# Patient Record
Sex: FEMALE | Race: WHITE | Employment: UNEMPLOYED | URBAN - METROPOLITAN AREA
[De-identification: names, ages, dates, MRNs, and addresses within clinical notes are randomized per-mention and may not be internally consistent; named-entity substitution may affect disease eponyms.]

---

## 2017-01-25 ENCOUNTER — HOSPITAL ENCOUNTER (EMERGENCY)
Age: 57
Discharge: HOME | End: 2017-01-25
Attending: EMERGENCY MEDICINE
Payer: MEDICARE

## 2017-01-25 VITALS
BODY MASS INDEX: 26.36 KG/M2 | RESPIRATION RATE: 18 BRPM | WEIGHT: 178 LBS | HEIGHT: 69 IN | OXYGEN SATURATION: 98 % | HEART RATE: 117 BPM | DIASTOLIC BLOOD PRESSURE: 98 MMHG | SYSTOLIC BLOOD PRESSURE: 155 MMHG | TEMPERATURE: 97.4 F

## 2017-01-25 DIAGNOSIS — M54.41 CHRONIC BILATERAL LOW BACK PAIN WITH BILATERAL SCIATICA: Primary | ICD-10-CM

## 2017-01-25 DIAGNOSIS — M54.42 CHRONIC BILATERAL LOW BACK PAIN WITH BILATERAL SCIATICA: Primary | ICD-10-CM

## 2017-01-25 DIAGNOSIS — G89.29 CHRONIC BILATERAL LOW BACK PAIN WITH BILATERAL SCIATICA: Primary | ICD-10-CM

## 2017-01-25 PROCEDURE — 99283 EMERGENCY DEPT VISIT LOW MDM: CPT | Performed by: NURSE PRACTITIONER

## 2017-01-25 PROCEDURE — 74011250637 HC RX REV CODE- 250/637: Performed by: NURSE PRACTITIONER

## 2017-01-25 RX ORDER — METHYLPREDNISOLONE 4 MG/1
TABLET ORAL
Qty: 1 DOSE PACK | Refills: 0 | Status: SHIPPED | OUTPATIENT
Start: 2017-01-25

## 2017-01-25 RX ORDER — DEXAMETHASONE SODIUM PHOSPHATE 100 MG/10ML
10 INJECTION INTRAMUSCULAR; INTRAVENOUS
Status: COMPLETED | OUTPATIENT
Start: 2017-01-25 | End: 2017-01-25

## 2017-01-25 RX ADMIN — DEXAMETHASONE SODIUM PHOSPHATE 10 MG: 10 INJECTION INTRAMUSCULAR; INTRAVENOUS at 15:32

## 2017-01-25 NOTE — ED TRIAGE NOTES
Reports has seen her MD and has ruptured discs and stenosis. States that she was told to come here for pain control.

## 2017-01-25 NOTE — Clinical Note
Take your medications that you have at home. Exercises given will help reduce pain. Start Medrol Dose pack in the morning. If you start having problems controlling your bowels and/or your bladder please return to the Emergency Department.

## 2024-01-16 ENCOUNTER — OFFICE VISIT (OUTPATIENT)
Dept: ORTHOPEDIC SURGERY | Age: 64
End: 2024-01-16

## 2024-01-16 DIAGNOSIS — M25.551 RIGHT HIP PAIN: Primary | ICD-10-CM

## 2024-01-16 DIAGNOSIS — M16.11 PRIMARY OSTEOARTHRITIS OF RIGHT HIP: ICD-10-CM

## 2024-01-16 NOTE — PROGRESS NOTES
Name: Olive Mireles  YOB: 1960  Gender: female  MRN: [id number]    CC:   Chief Complaint   Patient presents with    Hip Pain     Right hip          HPI: Olive Mireles is a 63 y.o. female with a PMHx of  depression and smoking  here for evaluation of right hip pain  There was not an acute injury  Regarding the hip pain, it has been present for 8 months and is becoming worse. The pain is primarily anterior into the groin  she describes the pain as aching, sharp, and stabbing  The pain is worse with going up and/or down stairs, rising after sitting, standing, and walking  The pain does radiate down the leg.  she reports numbness and tingling down the leg.   Treatment so far has been activity modification, meloxicam, and muscle relaxers with little relief.        Review of Systems  As per HPI.  Pertinent positives and negatives are addressed with the patient, particularly those related to musculoskeletal concerns.  Non-orthopaedic concerns were referred back to the primary care physician.      Not on File                 PHYSICAL EXAMINATION:   The patient is alert and oriented, in no distress.  There were no vitals filed for this visit.       The cervical, thoracic and lumbar spine are without compromising deformity. The upper extremities demonstrate reasonable tone, strength and function bilaterally.  The lower extremities are as described below.  Circulation is normal with palpable pedal pulses bilaterally and no edema.  Skin of the leg is normal with no chronic stasis disease bilaterally.  Sensation is intact to light touch bilaterally.  The gait is noted to be severe antalgic  There is no tenderness to palpation along the spinous processes and paraspinal musculature.   There moderate tenderness to palpation over the right greater trochanter  right hip range of motion is 0-90 degrees of flexion and 20 degrees on abduction and adduction.  There is 15 degrees internal rotation and 25 degrees of

## 2024-01-17 ENCOUNTER — TELEPHONE (OUTPATIENT)
Dept: ORTHOPEDIC SURGERY | Age: 64
End: 2024-01-17

## 2024-01-17 NOTE — TELEPHONE ENCOUNTER
Patient would like a return call to get the date of the surgery please, as she didn't write it down yesterday.

## 2024-01-22 ENCOUNTER — TELEPHONE (OUTPATIENT)
Dept: ORTHOPEDIC SURGERY | Age: 64
End: 2024-01-22

## 2024-01-23 ENCOUNTER — TELEPHONE (OUTPATIENT)
Dept: ORTHOPEDIC SURGERY | Age: 64
End: 2024-01-23

## 2024-01-23 NOTE — TELEPHONE ENCOUNTER
Pt is crying in pain from her Rt hip pain. She is requesting to be moved up from May for hip surgery. I recommended she go to the ER because of her acute pain and hopeless feelings of not being able to take the pain anymore. Pt says that she has gone to the ER multiple times and that they said they do not treat chronic pain. I told her I would contact Jayla and let her know about moving her up on the surgery schedule. Patient is with her sister because her nephew committed suicide at her home last week.

## 2024-02-01 ENCOUNTER — TELEPHONE (OUTPATIENT)
Dept: ORTHOPEDIC SURGERY | Age: 64
End: 2024-02-01

## 2024-02-02 NOTE — TELEPHONE ENCOUNTER
Spoke with patient and we will move her surgery up to 04/29/24 and keep her on the cancellaion list

## 2024-02-26 DIAGNOSIS — M16.11 PRIMARY OSTEOARTHRITIS OF RIGHT HIP: Primary | ICD-10-CM

## 2024-03-05 ENCOUNTER — TELEPHONE (OUTPATIENT)
Dept: ORTHOPEDIC SURGERY | Age: 64
End: 2024-03-05

## 2024-03-08 ENCOUNTER — TELEPHONE (OUTPATIENT)
Dept: ORTHOPEDIC SURGERY | Age: 64
End: 2024-03-08

## 2024-03-12 ENCOUNTER — TELEPHONE (OUTPATIENT)
Dept: ORTHOPEDIC SURGERY | Age: 64
End: 2024-03-12

## 2024-03-12 NOTE — TELEPHONE ENCOUNTER
Spoke with haleigh and let her know that we do have her on the cancellation list.will call her if we get one   - - -

## 2024-03-18 ENCOUNTER — TELEPHONE (OUTPATIENT)
Dept: ORTHOPEDIC SURGERY | Age: 64
End: 2024-03-18

## 2024-03-18 NOTE — TELEPHONE ENCOUNTER
Offered pt appointment to see Dr. Bhatti tomorrow and she declined, saying that she does not have a ride to our office. She is planning to go to [facility] and I advised her to bring any x-rays taken to our office if she needs to be seen here. Pt says she cannot raise her leg and can barely bear weight on her right side. Her hip to her ankle is hurting. This may be associated with her back. She is scheduled for rt valeria on 4/29/24 with Dr. Bhatti.

## 2024-03-18 NOTE — TELEPHONE ENCOUNTER
Pt called and said she took a back fall and her sx is supposed to be 04/29. She said her right hip to er knee is swollen and wants to speak to MA. Please call pt.

## 2024-03-18 NOTE — TELEPHONE ENCOUNTER
Viewed picture of right hip and bruising is over greater trochanter and femur. The bruising is yellowing and appears to be days old.

## 2024-03-27 ENCOUNTER — HOSPITAL ENCOUNTER (EMERGENCY)
Age: 64
Discharge: HOME | End: 2024-03-27
Payer: MEDICARE

## 2024-03-27 ENCOUNTER — TELEPHONE (OUTPATIENT)
Dept: ORTHOPEDIC SURGERY | Age: 64
End: 2024-03-27

## 2024-03-27 ENCOUNTER — APPOINTMENT (OUTPATIENT)
Dept: CT IMAGING | Age: 64
End: 2024-03-27
Payer: MEDICARE

## 2024-03-27 VITALS
DIASTOLIC BLOOD PRESSURE: 68 MMHG | TEMPERATURE: 97.9 F | SYSTOLIC BLOOD PRESSURE: 125 MMHG | HEIGHT: 67 IN | OXYGEN SATURATION: 99 % | RESPIRATION RATE: 18 BRPM | HEART RATE: 58 BPM | BODY MASS INDEX: 21.19 KG/M2 | WEIGHT: 135 LBS

## 2024-03-27 DIAGNOSIS — M16.11 ARTHRITIS OF RIGHT HIP: Primary | ICD-10-CM

## 2024-03-27 LAB
ANION GAP SERPL CALC-SCNC: 5 MMOL/L (ref 2–11)
BASOPHILS # BLD: 0.1 K/UL (ref 0–0.2)
BASOPHILS NFR BLD: 0 % (ref 0–2)
BUN SERPL-MCNC: 12 MG/DL (ref 8–23)
CALCIUM SERPL-MCNC: 9.8 MG/DL (ref 8.3–10.4)
CHLORIDE SERPL-SCNC: 101 MMOL/L (ref 103–113)
CO2 SERPL-SCNC: 27 MMOL/L (ref 21–32)
CREAT SERPL-MCNC: 0.7 MG/DL (ref 0.6–1)
DIFFERENTIAL METHOD BLD: ABNORMAL
EOSINOPHIL # BLD: 0.2 K/UL (ref 0–0.8)
EOSINOPHIL NFR BLD: 1 % (ref 0.5–7.8)
ERYTHROCYTE [DISTWIDTH] IN BLOOD BY AUTOMATED COUNT: 13.2 % (ref 11.9–14.6)
GLUCOSE SERPL-MCNC: 79 MG/DL (ref 65–100)
HCT VFR BLD AUTO: 43.1 % (ref 35.8–46.3)
HGB BLD-MCNC: 14.3 G/DL (ref 11.7–15.4)
IMM GRANULOCYTES # BLD AUTO: 0 K/UL (ref 0–0.5)
IMM GRANULOCYTES NFR BLD AUTO: 0 % (ref 0–5)
LYMPHOCYTES # BLD: 2.8 K/UL (ref 0.5–4.6)
LYMPHOCYTES NFR BLD: 21 % (ref 13–44)
MCH RBC QN AUTO: 29.4 PG (ref 26.1–32.9)
MCHC RBC AUTO-ENTMCNC: 33.2 G/DL (ref 31.4–35)
MCV RBC AUTO: 88.5 FL (ref 82–102)
MONOCYTES # BLD: 1 K/UL (ref 0.1–1.3)
MONOCYTES NFR BLD: 7 % (ref 4–12)
NEUTS SEG # BLD: 9.4 K/UL (ref 1.7–8.2)
NEUTS SEG NFR BLD: 71 % (ref 43–78)
NRBC # BLD: 0 K/UL (ref 0–0.2)
PLATELET # BLD AUTO: 281 K/UL (ref 150–450)
PMV BLD AUTO: 9.7 FL (ref 9.4–12.3)
POTASSIUM SERPL-SCNC: 3.7 MMOL/L (ref 3.5–5.1)
RBC # BLD AUTO: 4.87 M/UL (ref 4.05–5.2)
SODIUM SERPL-SCNC: 133 MMOL/L (ref 136–146)
WBC # BLD AUTO: 13.4 K/UL (ref 4.3–11.1)

## 2024-03-27 PROCEDURE — 6360000002 HC RX W HCPCS: Performed by: STUDENT IN AN ORGANIZED HEALTH CARE EDUCATION/TRAINING PROGRAM

## 2024-03-27 PROCEDURE — 85025 COMPLETE CBC W/AUTO DIFF WBC: CPT

## 2024-03-27 PROCEDURE — 96374 THER/PROPH/DIAG INJ IV PUSH: CPT

## 2024-03-27 PROCEDURE — 80048 BASIC METABOLIC PNL TOTAL CA: CPT

## 2024-03-27 PROCEDURE — 73700 CT LOWER EXTREMITY W/O DYE: CPT

## 2024-03-27 PROCEDURE — 99284 EMERGENCY DEPT VISIT MOD MDM: CPT

## 2024-03-27 PROCEDURE — 6370000000 HC RX 637 (ALT 250 FOR IP): Performed by: STUDENT IN AN ORGANIZED HEALTH CARE EDUCATION/TRAINING PROGRAM

## 2024-03-27 RX ORDER — MORPHINE SULFATE 4 MG/ML
4 INJECTION INTRAVENOUS ONCE
Status: DISCONTINUED | OUTPATIENT
Start: 2024-03-27 | End: 2024-03-27

## 2024-03-27 RX ORDER — HYDROCODONE BITARTRATE AND ACETAMINOPHEN 7.5; 325 MG/1; MG/1
1 TABLET ORAL EVERY 6 HOURS PRN
Qty: 12 TABLET | Refills: 0 | Status: SHIPPED | OUTPATIENT
Start: 2024-03-27 | End: 2024-03-30

## 2024-03-27 RX ORDER — ONDANSETRON 2 MG/ML
4 INJECTION INTRAMUSCULAR; INTRAVENOUS
Status: COMPLETED | OUTPATIENT
Start: 2024-03-27 | End: 2024-03-27

## 2024-03-27 RX ORDER — ONDANSETRON 4 MG/1
4 TABLET, FILM COATED ORAL 3 TIMES DAILY PRN
Qty: 15 TABLET | Refills: 0 | Status: SHIPPED | OUTPATIENT
Start: 2024-03-27

## 2024-03-27 RX ORDER — HYDROCODONE BITARTRATE AND ACETAMINOPHEN 7.5; 325 MG/1; MG/1
1 TABLET ORAL
Status: COMPLETED | OUTPATIENT
Start: 2024-03-27 | End: 2024-03-27

## 2024-03-27 RX ADMIN — ONDANSETRON 4 MG: 2 INJECTION INTRAMUSCULAR; INTRAVENOUS at 16:11

## 2024-03-27 RX ADMIN — HYDROCODONE BITARTRATE AND ACETAMINOPHEN 1 TABLET: 7.5; 325 TABLET ORAL at 16:10

## 2024-03-27 ASSESSMENT — ENCOUNTER SYMPTOMS
FACIAL SWELLING: 0
ABDOMINAL PAIN: 0
PHOTOPHOBIA: 0
VOMITING: 0
COUGH: 0
SHORTNESS OF BREATH: 0
TROUBLE SWALLOWING: 0

## 2024-03-27 ASSESSMENT — LIFESTYLE VARIABLES
HOW OFTEN DO YOU HAVE A DRINK CONTAINING ALCOHOL: NEVER
HOW MANY STANDARD DRINKS CONTAINING ALCOHOL DO YOU HAVE ON A TYPICAL DAY: PATIENT DOES NOT DRINK

## 2024-03-27 ASSESSMENT — PAIN SCALES - GENERAL: PAINLEVEL_OUTOF10: 8

## 2024-03-27 NOTE — ED PROVIDER NOTES
hip pain for two weeks, difficulty ambulating. eval  dislocation, fx    TECHNIQUE: Multiple-row detector helical CT examination of the right hip without  intravenous contrast.  Multiplanar reformatting was provided.    Radiation dose reduction techniques were used for this study. Our CT scanners  use one or all of the following: Automated exposure control, adjustment of the  mA and/or kV according to patient size, iterative reconstruction.     FINDINGS:  BONES: No acute fracture.     HIP JOINT: Sclerotic degenerative change and joint space narrowing right hip  with slight superior and lateral subluxation, small subarticular cysts, and  acetabular osteophytes. No fracture of pubic rami.    Left hip relatively preserved.    MUSCULATURE: No abnormal calcifications in muscles around the right hip.    SOFT TISSUES: Some subcutaneous edema noted adjacent to the right greater  trochanter coronal series 601 image 66        Impression    No acute fracture or dislocation right hip however there is relatively severe  degenerative change right hip with sclerotic obliteration of the joint space and  subarticular cysts.       BMP   Result Value Ref Range    Sodium 133 (L) 136 - 146 mmol/L    Potassium 3.7 3.5 - 5.1 mmol/L    Chloride 101 (L) 103 - 113 mmol/L    CO2 27 21 - 32 mmol/L    Anion Gap 5 2 - 11 mmol/L    Glucose 79 65 - 100 mg/dL    BUN 12 8 - 23 MG/DL    Creatinine 0.70 0.6 - 1.0 MG/DL    Est, Glom Filt Rate >90 >60 ml/min/1.73m2    Calcium 9.8 8.3 - 10.4 MG/DL   CBC with Auto Differential   Result Value Ref Range    WBC 13.4 (H) 4.3 - 11.1 K/uL    RBC 4.87 4.05 - 5.2 M/uL    Hemoglobin 14.3 11.7 - 15.4 g/dL    Hematocrit 43.1 35.8 - 46.3 %    MCV 88.5 82 - 102 FL    MCH 29.4 26.1 - 32.9 PG    MCHC 33.2 31.4 - 35.0 g/dL    RDW 13.2 11.9 - 14.6 %    Platelets 281 150 - 450 K/uL    MPV 9.7 9.4 - 12.3 FL    nRBC 0.00 0.0 - 0.2 K/uL    Differential Type AUTOMATED      Neutrophils % 71 43 - 78 %    Lymphocytes % 21 13 - 44

## 2024-03-27 NOTE — TELEPHONE ENCOUNTER
Please  call this  pt  asap      Her hip  is worse  something is  jutting  out  and  she  states  she is more  bed ridden.  Please  see previous messages

## 2024-03-27 NOTE — TELEPHONE ENCOUNTER
Spoke with patient and care giver . Patient is going to the er to eval the rt hip. Spoke with felipa wilson and let him know she was going so he could eval rt hip also will move surgery up to 04/09/24

## 2024-03-27 NOTE — DISCHARGE INSTRUCTIONS
Your CT did not show any broken bones or dislocations.  It did show significant arthritis.  I discussed this with your orthopedic doctor who wants you to follow-up at your scheduled surgery in April.  Please use prescribed pain medication to help control your pain.  Do not combine this with any other opioid pain medications.  Return here for new or worsening symptoms.    Risk of opioid analgesics include, but are not limited to: Overdosing can stop or slow your breathing and lead to death; fractures from falls; drowsiness leading to injury; tolerance, dependence and addiction. You should not operate any motorized vehicles or work from a height greater than ground level when taking opioid analgesics as they increase your fall risks. Do not combine these medications with any other opioid, to include street opioids such as heroin. Do not combine these medications with benzodiazepines like Xanax or alcohol as this may cause severe respiratory depression and death.    As we discussed, I did not find a life threatening cause of your symptoms today. However, THAT DOES NOT MEAN IT COULD NOT DEVELOP. If you develop ANY new or worsening symptoms, it is critical that you return for re-evaluation. This includes any symptoms that are concerning to you, especially symptoms such as fevers, true inability to bear weight, severe pain, redness, swelling.  If you do not return for re-evaluation, you risk serious complications, including death.

## 2024-04-01 ENCOUNTER — PREP FOR PROCEDURE (OUTPATIENT)
Dept: ORTHOPEDIC SURGERY | Age: 64
End: 2024-04-01

## 2024-04-01 DIAGNOSIS — M16.11 PRIMARY OSTEOARTHRITIS OF RIGHT HIP: Primary | ICD-10-CM

## 2024-04-01 RX ORDER — ACETAMINOPHEN 325 MG/1
1000 TABLET ORAL ONCE
Status: CANCELLED | OUTPATIENT
Start: 2024-04-01 | End: 2024-04-01

## 2024-04-01 RX ORDER — SODIUM CHLORIDE 0.9 % (FLUSH) 0.9 %
5-40 SYRINGE (ML) INJECTION EVERY 12 HOURS SCHEDULED
Status: CANCELLED | OUTPATIENT
Start: 2024-04-01

## 2024-04-01 RX ORDER — SODIUM CHLORIDE 0.9 % (FLUSH) 0.9 %
5-40 SYRINGE (ML) INJECTION PRN
Status: CANCELLED | OUTPATIENT
Start: 2024-04-01

## 2024-04-01 RX ORDER — SODIUM CHLORIDE 9 MG/ML
INJECTION, SOLUTION INTRAVENOUS PRN
Status: CANCELLED | OUTPATIENT
Start: 2024-04-01

## 2024-04-02 ENCOUNTER — HOSPITAL ENCOUNTER (OUTPATIENT)
Dept: SURGERY | Age: 64
Discharge: HOME | End: 2024-04-05
Payer: MEDICARE

## 2024-04-02 ENCOUNTER — HOSPITAL ENCOUNTER (OUTPATIENT)
Dept: REHABILITATION | Age: 64
Discharge: HOME | End: 2024-04-05
Payer: MEDICARE

## 2024-04-02 VITALS
RESPIRATION RATE: 16 BRPM | DIASTOLIC BLOOD PRESSURE: 61 MMHG | OXYGEN SATURATION: 95 % | HEART RATE: 62 BPM | WEIGHT: 136.69 LBS | SYSTOLIC BLOOD PRESSURE: 95 MMHG | BODY MASS INDEX: 21.45 KG/M2 | TEMPERATURE: 97.5 F | HEIGHT: 67 IN

## 2024-04-02 DIAGNOSIS — M16.11 PRIMARY OSTEOARTHRITIS OF RIGHT HIP: ICD-10-CM

## 2024-04-02 DIAGNOSIS — Z96.641 STATUS POST RIGHT HIP REPLACEMENT: ICD-10-CM

## 2024-04-02 DIAGNOSIS — M16.11 PRIMARY OSTEOARTHRITIS OF RIGHT HIP: Primary | ICD-10-CM

## 2024-04-02 DIAGNOSIS — R06.83 SNORING: Primary | ICD-10-CM

## 2024-04-02 DIAGNOSIS — G89.18 POSTOPERATIVE PAIN: ICD-10-CM

## 2024-04-02 LAB
ANION GAP SERPL CALC-SCNC: 7 MMOL/L (ref 2–11)
APTT PPP: 32.6 SEC (ref 23.3–37.4)
BASOPHILS # BLD: 0 K/UL (ref 0–0.2)
BASOPHILS NFR BLD: 1 % (ref 0–2)
BUN SERPL-MCNC: 10 MG/DL (ref 8–23)
CALCIUM SERPL-MCNC: 9.1 MG/DL (ref 8.3–10.4)
CHLORIDE SERPL-SCNC: 97 MMOL/L (ref 103–113)
CO2 SERPL-SCNC: 28 MMOL/L (ref 21–32)
CREAT SERPL-MCNC: 0.67 MG/DL (ref 0.6–1)
DIFFERENTIAL METHOD BLD: ABNORMAL
EKG ATRIAL RATE: 59 BPM
EKG DIAGNOSIS: NORMAL
EKG P AXIS: 27 DEGREES
EKG P-R INTERVAL: 152 MS
EKG Q-T INTERVAL: 402 MS
EKG QRS DURATION: 74 MS
EKG QTC CALCULATION (BAZETT): 397 MS
EKG R AXIS: 34 DEGREES
EKG T AXIS: 52 DEGREES
EKG VENTRICULAR RATE: 59 BPM
EOSINOPHIL # BLD: 0 K/UL (ref 0–0.8)
EOSINOPHIL NFR BLD: 0 % (ref 0.5–7.8)
ERYTHROCYTE [DISTWIDTH] IN BLOOD BY AUTOMATED COUNT: 13.3 % (ref 11.9–14.6)
EST. AVERAGE GLUCOSE BLD GHB EST-MCNC: 103 MG/DL
GLUCOSE SERPL-MCNC: 91 MG/DL (ref 65–100)
HBA1C MFR BLD: 5.2 % (ref 4.8–5.6)
HCT VFR BLD AUTO: 40.1 % (ref 35.8–46.3)
HGB BLD-MCNC: 13 G/DL (ref 11.7–15.4)
IMM GRANULOCYTES # BLD AUTO: 0 K/UL (ref 0–0.5)
IMM GRANULOCYTES NFR BLD AUTO: 0 % (ref 0–5)
INR PPP: 1
LYMPHOCYTES # BLD: 1 K/UL (ref 0.5–4.6)
LYMPHOCYTES NFR BLD: 26 % (ref 13–44)
MCH RBC QN AUTO: 28.8 PG (ref 26.1–32.9)
MCHC RBC AUTO-ENTMCNC: 32.4 G/DL (ref 31.4–35)
MCV RBC AUTO: 88.9 FL (ref 82–102)
MONOCYTES # BLD: 0.4 K/UL (ref 0.1–1.3)
MONOCYTES NFR BLD: 12 % (ref 4–12)
MRSA DNA SPEC QL NAA+PROBE: NOT DETECTED
NEUTS SEG # BLD: 2.3 K/UL (ref 1.7–8.2)
NEUTS SEG NFR BLD: 61 % (ref 43–78)
NRBC # BLD: 0 K/UL (ref 0–0.2)
PLATELET # BLD AUTO: 156 K/UL (ref 150–450)
PMV BLD AUTO: 11.2 FL (ref 9.4–12.3)
POTASSIUM SERPL-SCNC: 4.2 MMOL/L (ref 3.5–5.1)
PROTHROMBIN TIME: 13.6 SEC (ref 11.3–14.9)
RBC # BLD AUTO: 4.51 M/UL (ref 4.05–5.2)
S AUREUS CPE NOSE QL NAA+PROBE: NOT DETECTED
SODIUM SERPL-SCNC: 132 MMOL/L (ref 136–146)
WBC # BLD AUTO: 3.7 K/UL (ref 4.3–11.1)

## 2024-04-02 PROCEDURE — 85610 PROTHROMBIN TIME: CPT

## 2024-04-02 PROCEDURE — 85730 THROMBOPLASTIN TIME PARTIAL: CPT

## 2024-04-02 PROCEDURE — 80048 BASIC METABOLIC PNL TOTAL CA: CPT

## 2024-04-02 PROCEDURE — 87641 MR-STAPH DNA AMP PROBE: CPT

## 2024-04-02 PROCEDURE — 93005 ELECTROCARDIOGRAM TRACING: CPT | Performed by: ANESTHESIOLOGY

## 2024-04-02 PROCEDURE — 94664 DEMO&/EVAL PT USE INHALER: CPT

## 2024-04-02 PROCEDURE — 93010 ELECTROCARDIOGRAM REPORT: CPT | Performed by: INTERNAL MEDICINE

## 2024-04-02 PROCEDURE — 85025 COMPLETE CBC W/AUTO DIFF WBC: CPT

## 2024-04-02 PROCEDURE — 83036 HEMOGLOBIN GLYCOSYLATED A1C: CPT

## 2024-04-02 PROCEDURE — 97161 PT EVAL LOW COMPLEX 20 MIN: CPT

## 2024-04-02 RX ORDER — HYDROXYZINE HYDROCHLORIDE 25 MG/1
25 TABLET, FILM COATED ORAL EVERY 4 HOURS PRN
COMMUNITY
Start: 2022-04-07

## 2024-04-02 RX ORDER — DESVENLAFAXINE 100 MG/1
100 TABLET, EXTENDED RELEASE ORAL DAILY
COMMUNITY

## 2024-04-02 RX ORDER — METHOCARBAMOL 750 MG/1
750 TABLET, FILM COATED ORAL 3 TIMES DAILY PRN
Qty: 30 TABLET | Refills: 0 | Status: SHIPPED | OUTPATIENT
Start: 2024-04-08 | End: 2024-04-18

## 2024-04-02 RX ORDER — DULOXETIN HYDROCHLORIDE 30 MG/1
30 CAPSULE, DELAYED RELEASE ORAL DAILY
COMMUNITY

## 2024-04-02 RX ORDER — CELECOXIB 200 MG/1
200 CAPSULE ORAL 2 TIMES DAILY
Qty: 60 CAPSULE | Refills: 0 | Status: SHIPPED | OUTPATIENT
Start: 2024-04-08 | End: 2024-05-08

## 2024-04-02 RX ORDER — OXYCODONE HYDROCHLORIDE 5 MG/1
5-10 TABLET ORAL
Qty: 60 TABLET | Refills: 0 | Status: SHIPPED | OUTPATIENT
Start: 2024-04-08 | End: 2024-04-13

## 2024-04-02 RX ORDER — MELOXICAM 15 MG/1
1 TABLET ORAL AS NEEDED
COMMUNITY
Start: 2018-10-08

## 2024-04-02 RX ORDER — NALOXONE HYDROCHLORIDE 4 MG/.1ML
1 SPRAY NASAL PRN
COMMUNITY
Start: 2022-07-26

## 2024-04-02 RX ORDER — PREGABALIN 200 MG/1
200 CAPSULE ORAL 2 TIMES DAILY
COMMUNITY
Start: 2024-03-26 | End: 2024-06-24

## 2024-04-02 RX ORDER — PROMETHAZINE HYDROCHLORIDE 12.5 MG/1
12.5 TABLET ORAL EVERY 8 HOURS PRN
COMMUNITY
End: 2024-04-02

## 2024-04-02 RX ORDER — ASPIRIN 81 MG/1
81 TABLET ORAL 2 TIMES DAILY
Qty: 70 TABLET | Refills: 0 | Status: SHIPPED | OUTPATIENT
Start: 2024-04-08 | End: 2024-05-13

## 2024-04-02 RX ORDER — NICOTINE 21 MG/24HR
1 PATCH, TRANSDERMAL 24 HOURS TRANSDERMAL EVERY 24 HOURS
COMMUNITY
Start: 2024-03-12 | End: 2025-03-12

## 2024-04-02 RX ORDER — TIZANIDINE 4 MG/1
4 TABLET ORAL EVERY 8 HOURS PRN
COMMUNITY

## 2024-04-02 RX ORDER — PROMETHAZINE HYDROCHLORIDE 12.5 MG/1
12.5 TABLET ORAL 4 TIMES DAILY PRN
Qty: 20 TABLET | Refills: 2 | Status: SHIPPED | OUTPATIENT
Start: 2024-04-08

## 2024-04-02 RX ORDER — ALBUTEROL SULFATE 90 UG/1
1-2 AEROSOL, METERED RESPIRATORY (INHALATION) EVERY 4 HOURS PRN
COMMUNITY
Start: 2023-01-31

## 2024-04-02 RX ORDER — CLONAZEPAM 1 MG/1
1 TABLET, ORALLY DISINTEGRATING ORAL 2 TIMES DAILY PRN
COMMUNITY

## 2024-04-02 RX ORDER — LEVOTHYROXINE SODIUM 0.12 MG/1
125 TABLET ORAL DAILY
COMMUNITY

## 2024-04-02 ASSESSMENT — PROMIS GLOBAL HEALTH SCALE
IN GENERAL, HOW WOULD YOU RATE YOUR SATISFACTION WITH YOUR SOCIAL ACTIVITIES AND RELATIONSHIPS [ON A SCALE OF 1 (POOR) TO 5 (EXCELLENT)]?: FAIR
IN GENERAL, WOULD YOU SAY YOUR QUALITY OF LIFE IS...[ON A SCALE OF 1 (POOR) TO 5 (EXCELLENT)]: POOR
WHO IS THE PERSON COMPLETING THE PROMIS V1.1 SURVEY?: SELF
SUM OF RESPONSES TO QUESTIONS 3, 6, 7, & 8: 14
IN THE PAST 7 DAYS, HOW OFTEN HAVE YOU BEEN BOTHERED BY EMOTIONAL PROBLEMS, SUCH AS FEELING ANXIOUS, DEPRESSED, OR IRRITABLE [ON A SCALE FROM 1 (NEVER) TO 5 (ALWAYS)]?: ALWAYS
IN GENERAL, HOW WOULD YOU RATE YOUR MENTAL HEALTH, INCLUDING YOUR MOOD AND YOUR ABILITY TO THINK [ON A SCALE OF 1 (POOR) TO 5 (EXCELLENT)]?: FAIR
IN THE PAST 7 DAYS, HOW WOULD YOU RATE YOUR PAIN ON AVERAGE [ON A SCALE FROM 0 (NO PAIN) TO 10 (WORST IMAGINABLE PAIN)]?: 8
IN GENERAL, HOW WOULD YOU RATE YOUR PHYSICAL HEALTH [ON A SCALE OF 1 (POOR) TO 5 (EXCELLENT)]?: POOR
SUM OF RESPONSES TO QUESTIONS 2, 4, 5, & 10: 6
HOW IS THE PROMIS V1.1 BEING ADMINISTERED?: PAPER
IN GENERAL, PLEASE RATE HOW WELL YOU CARRY OUT YOUR USUAL SOCIAL ACTIVITIES (INCLUDES ACTIVITIES AT HOME, AT WORK, AND IN YOUR COMMUNITY, AND RESPONSIBILITIES AS A PARENT, CHILD, SPOUSE, EMPLOYEE, FRIEND, ETC) [ON A SCALE OF 1 (POOR) TO 5 (EXCELLENT)]?: FAIR
IN THE PAST 7 DAYS, HOW WOULD YOU RATE YOUR FATIGUE ON AVERAGE [ON A SCALE FROM 1 (NONE) TO 5 (VERY SEVERE)]?: MODERATE
TO WHAT EXTENT ARE YOU ABLE TO CARRY OUT YOUR EVERYDAY PHYSICAL ACTIVITIES SUCH AS WALKING, CLIMBING STAIRS, CARRYING GROCERIES, OR MOVING A CHAIR [ON A SCALE OF 1 (NOT AT ALL) TO 5 (COMPLETELY)]?: A LITTLE
IN GENERAL, WOULD YOU SAY YOUR HEALTH IS...[ON A SCALE OF 1 (POOR) TO 5 (EXCELLENT)]: POOR

## 2024-04-02 ASSESSMENT — HOOS JR
SITTING: EXTREME
GOING UP OR DOWN STAIRS: EXTREME
WALKING ON UNEVEN SURFACE: EXTREME
LYING IN BED (TURNING OVER, MAINTAINING HIP POSITION): EXTREME
HOOS JR RAW SCORE: 22
RISING FROM SITTING: SEVERE
HOOS JR TOTAL INTERVAL SCORE: 15.633
BENDING TO THE FLOOR TO PICK UP OBJECT: SEVERE
HOOS JR RAW SCORE: 22

## 2024-04-02 ASSESSMENT — PULMONARY FUNCTION TESTS
FEV1 (%PREDICTED): 79
FEV1 (LITERS): 2.03

## 2024-04-02 ASSESSMENT — PAIN DESCRIPTION - LOCATION
LOCATION: HIP
LOCATION: HIP

## 2024-04-02 ASSESSMENT — PAIN SCALES - GENERAL
PAINLEVEL_OUTOF10: 10
PAINLEVEL_OUTOF10: 9

## 2024-04-02 ASSESSMENT — PAIN DESCRIPTION - ORIENTATION
ORIENTATION: RIGHT;ANTERIOR;INNER;OUTER;POSTERIOR
ORIENTATION: RIGHT

## 2024-04-02 NOTE — PROGRESS NOTES
Patient verified name and .    Order for consent not found in EHR and unable to match consent with case posting; patient verified.     Type 3 surgery, joint camp assessment complete.    Labs per surgeon: CBC,BMP, PT/INR, Hgb A1c ; results within anesthesia guidelines  Labs per anesthesia protocol: no additional  EKG:completed today per protocol and within anesthesia guidelines    MRSA/MSSA swab collected; pharmacy to review and dose antibiotic as appropriate.     Hospital approved surgical skin cleanser and instructions to return bottle on DOS given per hospital policy.    Patient provided with handouts including Guide to Surgery, Pain Management, Hand Hygiene, Blood Transfusion Education, and [facility] Brochure.    Patient answered medical/surgical history questions at their best of ability. All prior to admission medications documented in [facility]. Original medication prescription bottle not visualized during patient appointment.     Patient instructed to hold all vitamins 3 weeks prior to surgery and NSAIDS 5 days prior to surgery.     Patient teach back successful and patient demonstrates knowledge of instruction.

## 2024-04-02 NOTE — PROGRESS NOTES
PLEASE CONTINUE TAKING ALL PRESCRIPTION MEDICATIONS UP TO THE DAY OF SURGERY UNLESS OTHERWISE DIRECTED BELOW.    DISCONTINUE all vitamins, herbals and supplements 3 weeks prior to surgery. DISCONTINUE Non-Steroidal Anti-Inflammatory (NSAIDS) such as Advil, Ibuprofen, Motrin, Naproxen and Aleve 5 days prior to surgery.       Home Medications to take  the day of surgery    USE/BRING inhalers   Clonazepam, if needed   Lyrica, Pristiq, Cymbalta, Levothyroxine     Home Medications to Hold- please continue all other medications except these.    HOLD Meloxicam for 5 days prior to surgery        Comments   On the day before surgery please take Acetaminophen 1000mg in the morning and then again before bed. You may substitute for Tylenol 650 mg.      Bring Dynahex wash and Incentive Spirometer with you to hospital on the day of surgery.            Please do not bring home medications with you on the day of surgery unless otherwise directed by your nurse.  If you are instructed to bring home medications, please give them to your nurse as they will be administered by the nursing staff.    If you have any questions, please call [facility] [phone].    A copy of this note was provided to the patient for reference.

## 2024-04-02 NOTE — H&P
[facility]  Pre Operative History and Physical Exam    Patient ID:  Olive Mireles  [id number]  63 y.o.  1960    Today: April 2, 2024           CC:  Right hip pain    HPI:   The patient has end stage arthritis of the right hip. The patient was evaluated and examined during a consultation prior to this office visit.  There have been no changes to the patient's orthopedic condition since the initial consultation. The patient has failed previous conservative treatment for this condition including antiinflammatories , and lifestyle modifications. The necessity for joint replacement is present. The patient will be admitted the day of surgery for right hip replacement    Past Medical/Surgical History:  Past Medical History:   Diagnosis Date    Anxiety     Arthritis     Asthma     Chronic bilateral low back pain with bilateral sciatica     Chronic dental pain     Complicated grief     DDD (degenerative disc disease), cervical     History of blood transfusion 1990    Hypotension     Hypothyroidism     ILD (interstitial lung disease) ([facility])     followed by pulmonary specialist    [facility] historian     Tobacco abuse disorder     1 ppd smoker 11 years; down to 5 cigarettes now, trying to quit     Past Surgical History:   Procedure Laterality Date    APPENDECTOMY      CERVICAL DISCECTOMY  09/20/2019    CERVICAL DISCECTOMY  2017    CERVICAL LAMINECTOMY      with decompression C2-3 fusion and fixation    COLONOSCOPY      FOOT SURGERY Left 2001    HYSTERECTOMY (CERVIX STATUS UNKNOWN)  2001    POSTERIOR CERVICAL LAMINECTOMY  05/25/2022    cervical three-seven posterior fusion and fixation with four-five laminectomy    SPINAL FUSION  10/19/2022    cervical        Allergies:   Allergies   Allergen Reactions    Acetaminophen Swelling    Penicillins Anaphylaxis, Hives, Rash and Swelling    Tapentadol Anaphylaxis and Other (See Comments)     Other Reaction(s): Other- (not listed) - Allergy      Heart palpitation

## 2024-04-02 NOTE — PROGRESS NOTES
04/02/24 1130   Treatment   Treatment Type Bedside spirometry   Breath Sounds   Breath Sounds Bilateral Diminished   Oxygen Therapy/Pulse Ox   O2 Therapy Room air   Pulse 62   SpO2 95 %   Bedside Spirometry   FEV-1/Actual (Liters) 2.03 Liters   FEV-1/Predicted (Liters) 79 Liters   RT Misc Charges   $RT Education $HHN/MDI/IPPB Demo or Eval  (SPACER)     Pt's symptoms include:    Snoring  Tiredness- excessive daytime sleepiness  Observed apnea  HX OF HTN  GERD  Neck size        36      cm  Modified Soares stage 4  SACS Score 11    Height    5  '   7 \"   Weight   136  lbs  BMI 21.41      Refer patient for sleep study based on above assessment.  HST  Phone number:      Initial respiratory Assessment completed with pt. Pt was interviewed and evaluated in Joint camp prior to surgery.  Patient ID:  Olive Mireles  [id number]  63 y.o.  1960  Surgeon: Dr. Bhatti  Date of Surgery: [unfilled]4/9/2024  Procedure: Total Right Hip Arthroplasty  Primary Care Physician: Joselyn Burns, APRN - [phone]  Specialists:BRITTNEY WHITE- PULMONOLOGY AT [facility]    Pt taught proper COUGH technique  IS REVIEWED WITH PT AS WELL AS BENEFITS OF USING IS IN SEDENTARY PTS.  DIAPHRAGMATIC BREATHING EXERCISE INSTRUCTIONS GIVEN    History of smoking:   CURRENT SMOKER-      1    PPD for      30      YEARS- DOWN TO 5 CIGARETTES/DAY- TRYING TO QUIT  Smoking Cessation  \"SMOKING: YOUR PLAN TO QUIT\" handout given to pt.    Pt taught to identify when anxiety or stress  is a trigger .  Relaxation breathing technique taught to pt.                 Quit date:         Secondhand smoke:DENIES    Past procedures with Oxygen desaturation or delayed awakening:DENIES     Respiratory history:COPD/ASTHMA  ILD  PNA 4 TIMES- HOSPITALIZED TWICE                                 SOB  ON EXERTION                                    Respiratory meds: COMBIVENT RESPIMAT BID  PT uses MDI PRN with PROAIR.   MDI instructions given verbally & written along

## 2024-04-02 NOTE — PROGRESS NOTES
Olive Mireles  : 1960  Primary: [employer]  Secondary: [place]ICA[place] [facility] [place] [address]  [phone]      Physical Therapy Prehab Evaluation Summary:2024   Time In/Out   PT Charge Capture  Episode     MEDICAL/REFERRING DIAGNOSIS: Unilateral primary osteoarthritis, right hip [M16.11]  REFERRING PHYSICIAN: Sean Bhatti Jr., *    Treatment Diagnosis:   Pain in Right Hip (M25.551)  Stiffness of Right Hip, Not elsewhere classified (M25.651)    DATE OF SURGERY: 24  Assessment:   COMMENTS:  Ms. Mireles is present for a Prehab Physical Therapy Assessment for their upcoming right MICHAEL . They are here with her sister . After discussing the surgical admission options and discharge plans, they are planning on discharging on day of surgery.    She is in a lot of pain and is very limited in her ambulation distance due to this. Uses a RW at home and cane outside just for ease of transport. She wants to go home on the day of surgery. I am a little nervous about this with her extreme limitations currently. We will see how she does. She lives alone but says that her  sister and a home health aide will help her.    PROBLEM LIST:   (Impacting functional limitations):  Ms. Mireles presents with the following lower extremity(s) problems:  Strength  Range of Motion  Home Exercise Program  Pain INTERVENTIONS PLANNED:   (Benefits and precautions of physical therapy have been discussed with the patient.)  Home Exercise Program  Educational Discussion       GOALS: (Goals have been discussed and agreed upon with patient.)  Discharge Goals: Time Frame: 1 Day  Patient will demonstrate independence with a home exercise program designed to increase strength, range of motion, and pain control to minimize functional deficits and optimize patient for total joint replacement.    Subjective:   Past Medical History/Comorbidities:   Ms. Mireles  has a past

## 2024-04-02 NOTE — TELEPHONE ENCOUNTER
Called pt and let her know that we do not need to see her for a preop since she has already seen Dr. Bhatti recently. Pt plans to be a SDD

## 2024-04-02 NOTE — TELEPHONE ENCOUNTER
Patient would like to reschedule her Pre Op appt for this afternoon. Patient says she can barley stand because she has been on her feet today for about 3 hours at Joint camp. Please call

## 2024-04-02 NOTE — PROGRESS NOTES
Latest Reference Range & Units 04/02/24 10:44   Sodium 136 - 146 mmol/L 132 (L)   Potassium 3.5 - 5.1 mmol/L 4.2   Chloride 103 - 113 mmol/L 97 (L)   CO2 21 - 32 mmol/L 28   BUN,BUNPL 8 - 23 MG/DL 10   Creatinine 0.6 - 1.0 MG/DL 0.67   Anion Gap 2 - 11 mmol/L 7   Est, Glom Filt Rate >60 ml/min/1.73m2 >90   Glucose, Random 65 - 100 mg/dL 91   CALCIUM, SERUM, 520190 8.3 - 10.4 MG/DL 9.1   Hemoglobin A1C 4.8 - 5.6 % 5.2   eAG (mg/dL) mg/dL 103   WBC 4.3 - 11.1 K/uL 3.7 (L)   RBC 4.05 - 5.2 M/uL 4.51   Hemoglobin Quant 11.7 - 15.4 g/dL 13.0   Hematocrit 35.8 - 46.3 % 40.1   MCV 82.0 - 102.0 FL 88.9   MCH 26.1 - 32.9 PG 28.8   MCHC 31.4 - 35.0 g/dL 32.4   MPV 9.4 - 12.3 FL 11.2   RDW 11.9 - 14.6 % 13.3   Platelet Count 150 - 450 K/uL 156   Neutrophils, Automated 43 - 78 % 61   Lymphocyte % 13 - 44 % 26   Monocytes % 4.0 - 12.0 % 12   Eosinophils % 0.5 - 7.8 % 0 (L)   Basophils % 0.0 - 2.0 % 1   Neutrophils Absolute 1.7 - 8.2 K/UL 2.3   Lymphocytes Absolute 0.5 - 4.6 K/UL 1.0   Monocytes Absolute 0.1 - 1.3 K/UL 0.4   Eosinophils Absolute 0.0 - 0.8 K/UL 0.0   Basophils Absolute 0.0 - 0.2 K/UL 0.0   Differential Type -   AUTOMATED   Immature Granulocytes 0.0 - 5.0 % 0   Nucleated Red Blood Cells 0.0 - 0.2 K/uL 0.00   Absolute Immature Granulocyte 0.0 - 0.5 K/UL 0.0   Prothrombin Time 11.3 - 14.9 sec 13.6   INR -   1.0   APTT 23.3 - 37.4 SEC 32.6   (L): Data is abnormally low

## 2024-04-08 ENCOUNTER — TELEPHONE (OUTPATIENT)
Dept: ORTHOPEDIC SURGERY | Age: 64
End: 2024-04-08

## 2024-04-08 NOTE — TELEPHONE ENCOUNTER
Spoke with patient and she has a cough . She had spoke to someone at the hospital and they explained that she needs to show up for her evaluation

## 2024-04-08 NOTE — PERIOP NOTE
Pt states she has been having a persistent, productive cough with clear sputum, and SOB, cough is worse when laying down. Per patient, no fever or GI symptoms.  notified and patient instructed to take some allergy medicine and we will reevaluate upon arrival tomorrow, its possible case will be postponed and patient was made aware.

## 2024-04-09 ENCOUNTER — ANESTHESIA (OUTPATIENT)
Dept: SURGERY | Age: 64
End: 2024-04-09
Payer: MEDICARE

## 2024-04-09 ENCOUNTER — HOME HEALTH ADMISSION (OUTPATIENT)
Dept: HOME HEALTH SERVICES | Facility: HOME HEALTH | Age: 64
End: 2024-04-09
Payer: MEDICARE

## 2024-04-09 ENCOUNTER — APPOINTMENT (OUTPATIENT)
Dept: GENERAL RADIOLOGY | Age: 64
End: 2024-04-09
Attending: ORTHOPAEDIC SURGERY
Payer: MEDICARE

## 2024-04-09 ENCOUNTER — ANESTHESIA EVENT (OUTPATIENT)
Dept: SURGERY | Age: 64
End: 2024-04-09
Payer: MEDICARE

## 2024-04-09 ENCOUNTER — HOSPITAL ENCOUNTER (OUTPATIENT)
Age: 64
Discharge: HOME HEALTH | End: 2024-04-11
Attending: ORTHOPAEDIC SURGERY | Admitting: ORTHOPAEDIC SURGERY
Payer: MEDICARE

## 2024-04-09 DIAGNOSIS — Z96.641 STATUS POST RIGHT HIP REPLACEMENT: Primary | ICD-10-CM

## 2024-04-09 LAB
HCT VFR BLD AUTO: 37.4 % (ref 35.8–46.3)
HGB BLD-MCNC: 11.9 G/DL (ref 11.7–15.4)

## 2024-04-09 PROCEDURE — 85018 HEMOGLOBIN: CPT

## 2024-04-09 PROCEDURE — 6360000002 HC RX W HCPCS: Performed by: ANESTHESIOLOGY

## 2024-04-09 PROCEDURE — 94761 N-INVAS EAR/PLS OXIMETRY MLT: CPT

## 2024-04-09 PROCEDURE — C1713 ANCHOR/SCREW BN/BN,TIS/BN: HCPCS | Performed by: ORTHOPAEDIC SURGERY

## 2024-04-09 PROCEDURE — 3700000001 HC ADD 15 MINUTES (ANESTHESIA): Performed by: ORTHOPAEDIC SURGERY

## 2024-04-09 PROCEDURE — 6370000000 HC RX 637 (ALT 250 FOR IP): Performed by: ANESTHESIOLOGY

## 2024-04-09 PROCEDURE — 2580000003 HC RX 258: Performed by: ANESTHESIOLOGY

## 2024-04-09 PROCEDURE — 6360000002 HC RX W HCPCS: Performed by: REGISTERED NURSE

## 2024-04-09 PROCEDURE — 3600000005 HC SURGERY LEVEL 5 BASE: Performed by: ORTHOPAEDIC SURGERY

## 2024-04-09 PROCEDURE — 2500000003 HC RX 250 WO HCPCS: Performed by: ANESTHESIOLOGY

## 2024-04-09 PROCEDURE — 2580000003 HC RX 258: Performed by: PHYSICIAN ASSISTANT

## 2024-04-09 PROCEDURE — 6370000000 HC RX 637 (ALT 250 FOR IP): Performed by: PHYSICIAN ASSISTANT

## 2024-04-09 PROCEDURE — 87205 SMEAR GRAM STAIN: CPT

## 2024-04-09 PROCEDURE — 72170 X-RAY EXAM OF PELVIS: CPT

## 2024-04-09 PROCEDURE — 97161 PT EVAL LOW COMPLEX 20 MIN: CPT

## 2024-04-09 PROCEDURE — 3700000000 HC ANESTHESIA ATTENDED CARE: Performed by: ORTHOPAEDIC SURGERY

## 2024-04-09 PROCEDURE — 97530 THERAPEUTIC ACTIVITIES: CPT

## 2024-04-09 PROCEDURE — 94760 N-INVAS EAR/PLS OXIMETRY 1: CPT

## 2024-04-09 PROCEDURE — 97535 SELF CARE MNGMENT TRAINING: CPT

## 2024-04-09 PROCEDURE — 87075 CULTR BACTERIA EXCEPT BLOOD: CPT

## 2024-04-09 PROCEDURE — 7100000001 HC PACU RECOVERY - ADDTL 15 MIN: Performed by: ORTHOPAEDIC SURGERY

## 2024-04-09 PROCEDURE — 27130 TOTAL HIP ARTHROPLASTY: CPT | Performed by: ORTHOPAEDIC SURGERY

## 2024-04-09 PROCEDURE — 6360000002 HC RX W HCPCS: Performed by: ORTHOPAEDIC SURGERY

## 2024-04-09 PROCEDURE — 6370000000 HC RX 637 (ALT 250 FOR IP): Performed by: ORTHOPAEDIC SURGERY

## 2024-04-09 PROCEDURE — 3600000015 HC SURGERY LEVEL 5 ADDTL 15MIN: Performed by: ORTHOPAEDIC SURGERY

## 2024-04-09 PROCEDURE — 2500000003 HC RX 250 WO HCPCS: Performed by: REGISTERED NURSE

## 2024-04-09 PROCEDURE — 6360000002 HC RX W HCPCS: Performed by: PHYSICIAN ASSISTANT

## 2024-04-09 PROCEDURE — 85014 HEMATOCRIT: CPT

## 2024-04-09 PROCEDURE — 94640 AIRWAY INHALATION TREATMENT: CPT

## 2024-04-09 PROCEDURE — 7100000000 HC PACU RECOVERY - FIRST 15 MIN: Performed by: ORTHOPAEDIC SURGERY

## 2024-04-09 PROCEDURE — 2500000003 HC RX 250 WO HCPCS: Performed by: PHYSICIAN ASSISTANT

## 2024-04-09 PROCEDURE — 2580000003 HC RX 258: Performed by: REGISTERED NURSE

## 2024-04-09 PROCEDURE — 87070 CULTURE OTHR SPECIMN AEROBIC: CPT

## 2024-04-09 PROCEDURE — C1776 JOINT DEVICE (IMPLANTABLE): HCPCS | Performed by: ORTHOPAEDIC SURGERY

## 2024-04-09 PROCEDURE — 97165 OT EVAL LOW COMPLEX 30 MIN: CPT

## 2024-04-09 PROCEDURE — 36415 COLL VENOUS BLD VENIPUNCTURE: CPT

## 2024-04-09 PROCEDURE — 88304 TISSUE EXAM BY PATHOLOGIST: CPT

## 2024-04-09 PROCEDURE — 2709999900 HC NON-CHARGEABLE SUPPLY: Performed by: ORTHOPAEDIC SURGERY

## 2024-04-09 PROCEDURE — 2720000010 HC SURG SUPPLY STERILE: Performed by: ORTHOPAEDIC SURGERY

## 2024-04-09 DEVICE — CERAMIC V40 FEMORAL HEAD
Type: IMPLANTABLE DEVICE | Site: HIP | Status: FUNCTIONAL
Brand: BIOLOX

## 2024-04-09 DEVICE — TRIDENT X3 10 DEGREE POLYETHYLENE INSERT
Type: IMPLANTABLE DEVICE | Site: HIP | Status: FUNCTIONAL
Brand: TRIDENT X3 INSERT

## 2024-04-09 DEVICE — COMPONENT TOT HIP CAPPED LNR POLYETH H2STRYKER] STRYKER CORP]: Type: IMPLANTABLE DEVICE | Site: HIP | Status: FUNCTIONAL

## 2024-04-09 DEVICE — TRIDENT II TRITANIUM CLUSTER 54E
Type: IMPLANTABLE DEVICE | Site: HIP | Status: FUNCTIONAL
Brand: TRIDENT II

## 2024-04-09 DEVICE — HIP STEM - STANDARD OFFSET
Type: IMPLANTABLE DEVICE | Site: HIP | Status: FUNCTIONAL
Brand: INSIGNIA

## 2024-04-09 DEVICE — 6.5MM LOW PROFILE HEX SCREW 25MM
Type: IMPLANTABLE DEVICE | Site: HIP | Status: FUNCTIONAL
Brand: TRIDENT II

## 2024-04-09 RX ORDER — ASPIRIN 81 MG/1
81 TABLET ORAL 2 TIMES DAILY
Status: DISCONTINUED | OUTPATIENT
Start: 2024-04-09 | End: 2024-04-11 | Stop reason: HOSPADM

## 2024-04-09 RX ORDER — KETOROLAC TROMETHAMINE 30 MG/ML
INJECTION, SOLUTION INTRAMUSCULAR; INTRAVENOUS PRN
Status: DISCONTINUED | OUTPATIENT
Start: 2024-04-09 | End: 2024-04-09 | Stop reason: ALTCHOICE

## 2024-04-09 RX ORDER — SODIUM CHLORIDE 0.9 % (FLUSH) 0.9 %
5-40 SYRINGE (ML) INJECTION EVERY 12 HOURS SCHEDULED
Status: DISCONTINUED | OUTPATIENT
Start: 2024-04-09 | End: 2024-04-11 | Stop reason: HOSPADM

## 2024-04-09 RX ORDER — FENTANYL CITRATE 50 UG/ML
INJECTION, SOLUTION INTRAMUSCULAR; INTRAVENOUS PRN
Status: DISCONTINUED | OUTPATIENT
Start: 2024-04-09 | End: 2024-04-09 | Stop reason: SDUPTHER

## 2024-04-09 RX ORDER — TRANEXAMIC ACID 100 MG/ML
INJECTION, SOLUTION INTRAVENOUS PRN
Status: DISCONTINUED | OUTPATIENT
Start: 2024-04-09 | End: 2024-04-09 | Stop reason: SDUPTHER

## 2024-04-09 RX ORDER — CLONAZEPAM 1 MG/1
1 TABLET ORAL 2 TIMES DAILY PRN
Status: DISCONTINUED | OUTPATIENT
Start: 2024-04-09 | End: 2024-04-11 | Stop reason: HOSPADM

## 2024-04-09 RX ORDER — SENNA AND DOCUSATE SODIUM 50; 8.6 MG/1; MG/1
1 TABLET, FILM COATED ORAL 2 TIMES DAILY
Status: DISCONTINUED | OUTPATIENT
Start: 2024-04-09 | End: 2024-04-11 | Stop reason: HOSPADM

## 2024-04-09 RX ORDER — DIPHENHYDRAMINE HCL 25 MG
25 CAPSULE ORAL EVERY 6 HOURS PRN
Status: DISCONTINUED | OUTPATIENT
Start: 2024-04-09 | End: 2024-04-11 | Stop reason: HOSPADM

## 2024-04-09 RX ORDER — PROCHLORPERAZINE EDISYLATE 5 MG/ML
10 INJECTION INTRAMUSCULAR; INTRAVENOUS EVERY 6 HOURS PRN
Status: DISCONTINUED | OUTPATIENT
Start: 2024-04-09 | End: 2024-04-11 | Stop reason: HOSPADM

## 2024-04-09 RX ORDER — LIDOCAINE HYDROCHLORIDE 10 MG/ML
1 INJECTION, SOLUTION INFILTRATION; PERINEURAL
Status: COMPLETED | OUTPATIENT
Start: 2024-04-09 | End: 2024-04-09

## 2024-04-09 RX ORDER — HYDROMORPHONE HYDROCHLORIDE 2 MG/1
2 TABLET ORAL
Status: DISCONTINUED | OUTPATIENT
Start: 2024-04-09 | End: 2024-04-11 | Stop reason: HOSPADM

## 2024-04-09 RX ORDER — ALBUTEROL SULFATE 2.5 MG/3ML
2.5 SOLUTION RESPIRATORY (INHALATION)
Status: COMPLETED | OUTPATIENT
Start: 2024-04-09 | End: 2024-04-09

## 2024-04-09 RX ORDER — ACETAMINOPHEN 500 MG
2 TABLET ORAL EVERY 6 HOURS PRN
COMMUNITY

## 2024-04-09 RX ORDER — SODIUM CHLORIDE 0.9 % (FLUSH) 0.9 %
5-40 SYRINGE (ML) INJECTION PRN
Status: DISCONTINUED | OUTPATIENT
Start: 2024-04-09 | End: 2024-04-11 | Stop reason: HOSPADM

## 2024-04-09 RX ORDER — OXYCODONE HYDROCHLORIDE 5 MG/1
5 TABLET ORAL
Status: COMPLETED | OUTPATIENT
Start: 2024-04-09 | End: 2024-04-09

## 2024-04-09 RX ORDER — SODIUM CHLORIDE 9 MG/ML
INJECTION, SOLUTION INTRAVENOUS PRN
Status: DISCONTINUED | OUTPATIENT
Start: 2024-04-09 | End: 2024-04-11 | Stop reason: HOSPADM

## 2024-04-09 RX ORDER — TRAMADOL HYDROCHLORIDE 50 MG/1
100 TABLET ORAL EVERY 6 HOURS PRN
Status: DISCONTINUED | OUTPATIENT
Start: 2024-04-09 | End: 2024-04-11 | Stop reason: HOSPADM

## 2024-04-09 RX ORDER — PROPOFOL 10 MG/ML
INJECTION, EMULSION INTRAVENOUS PRN
Status: DISCONTINUED | OUTPATIENT
Start: 2024-04-09 | End: 2024-04-09 | Stop reason: SDUPTHER

## 2024-04-09 RX ORDER — HYDROMORPHONE HYDROCHLORIDE 1 MG/ML
0.5 INJECTION, SOLUTION INTRAMUSCULAR; INTRAVENOUS; SUBCUTANEOUS EVERY 5 MIN PRN
Status: COMPLETED | OUTPATIENT
Start: 2024-04-09 | End: 2024-04-09

## 2024-04-09 RX ORDER — ACETAMINOPHEN 325 MG/1
650 TABLET ORAL EVERY 6 HOURS
Status: DISCONTINUED | OUTPATIENT
Start: 2024-04-09 | End: 2024-04-09

## 2024-04-09 RX ORDER — SODIUM CHLORIDE, SODIUM LACTATE, POTASSIUM CHLORIDE, CALCIUM CHLORIDE 600; 310; 30; 20 MG/100ML; MG/100ML; MG/100ML; MG/100ML
INJECTION, SOLUTION INTRAVENOUS CONTINUOUS
Status: DISCONTINUED | OUTPATIENT
Start: 2024-04-09 | End: 2024-04-09 | Stop reason: HOSPADM

## 2024-04-09 RX ORDER — HYDROMORPHONE HYDROCHLORIDE 1 MG/ML
1 INJECTION, SOLUTION INTRAMUSCULAR; INTRAVENOUS; SUBCUTANEOUS
Status: DISCONTINUED | OUTPATIENT
Start: 2024-04-09 | End: 2024-04-09

## 2024-04-09 RX ORDER — ROPIVACAINE HYDROCHLORIDE 2 MG/ML
INJECTION, SOLUTION EPIDURAL; INFILTRATION; PERINEURAL PRN
Status: DISCONTINUED | OUTPATIENT
Start: 2024-04-09 | End: 2024-04-09 | Stop reason: ALTCHOICE

## 2024-04-09 RX ORDER — MORPHINE SULFATE 2 MG/ML
2 INJECTION, SOLUTION INTRAMUSCULAR; INTRAVENOUS
Status: DISCONTINUED | OUTPATIENT
Start: 2024-04-09 | End: 2024-04-11 | Stop reason: HOSPADM

## 2024-04-09 RX ORDER — ALBUTEROL SULFATE 2.5 MG/3ML
2.5 SOLUTION RESPIRATORY (INHALATION) EVERY 6 HOURS PRN
Status: DISCONTINUED | OUTPATIENT
Start: 2024-04-09 | End: 2024-04-11 | Stop reason: HOSPADM

## 2024-04-09 RX ORDER — VANCOMYCIN HYDROCHLORIDE 1 G/20ML
INJECTION, POWDER, LYOPHILIZED, FOR SOLUTION INTRAVENOUS PRN
Status: DISCONTINUED | OUTPATIENT
Start: 2024-04-09 | End: 2024-04-09 | Stop reason: ALTCHOICE

## 2024-04-09 RX ORDER — ONDANSETRON 2 MG/ML
4 INJECTION INTRAMUSCULAR; INTRAVENOUS
Status: DISCONTINUED | OUTPATIENT
Start: 2024-04-09 | End: 2024-04-09 | Stop reason: HOSPADM

## 2024-04-09 RX ORDER — DIPHENHYDRAMINE HYDROCHLORIDE 50 MG/ML
25 INJECTION INTRAMUSCULAR; INTRAVENOUS EVERY 6 HOURS PRN
Status: DISCONTINUED | OUTPATIENT
Start: 2024-04-09 | End: 2024-04-11 | Stop reason: HOSPADM

## 2024-04-09 RX ORDER — ONDANSETRON 4 MG/1
4 TABLET, ORALLY DISINTEGRATING ORAL EVERY 8 HOURS PRN
Status: DISCONTINUED | OUTPATIENT
Start: 2024-04-09 | End: 2024-04-11 | Stop reason: HOSPADM

## 2024-04-09 RX ORDER — LIDOCAINE HYDROCHLORIDE 20 MG/ML
INJECTION, SOLUTION EPIDURAL; INFILTRATION; INTRACAUDAL; PERINEURAL PRN
Status: DISCONTINUED | OUTPATIENT
Start: 2024-04-09 | End: 2024-04-09 | Stop reason: SDUPTHER

## 2024-04-09 RX ORDER — NALOXONE HYDROCHLORIDE 0.4 MG/ML
INJECTION, SOLUTION INTRAMUSCULAR; INTRAVENOUS; SUBCUTANEOUS PRN
Status: DISCONTINUED | OUTPATIENT
Start: 2024-04-09 | End: 2024-04-09 | Stop reason: HOSPADM

## 2024-04-09 RX ORDER — ONDANSETRON 2 MG/ML
4 INJECTION INTRAMUSCULAR; INTRAVENOUS EVERY 6 HOURS PRN
Status: DISCONTINUED | OUTPATIENT
Start: 2024-04-09 | End: 2024-04-11 | Stop reason: HOSPADM

## 2024-04-09 RX ORDER — METHOCARBAMOL 750 MG/1
750 TABLET, FILM COATED ORAL 3 TIMES DAILY PRN
Status: DISCONTINUED | OUTPATIENT
Start: 2024-04-09 | End: 2024-04-11 | Stop reason: HOSPADM

## 2024-04-09 RX ORDER — OXYCODONE HYDROCHLORIDE 5 MG/1
10 TABLET ORAL EVERY 4 HOURS PRN
Status: DISCONTINUED | OUTPATIENT
Start: 2024-04-09 | End: 2024-04-09

## 2024-04-09 RX ADMIN — HYDROMORPHONE HYDROCHLORIDE 0.5 MG: 1 INJECTION, SOLUTION INTRAMUSCULAR; INTRAVENOUS; SUBCUTANEOUS at 10:33

## 2024-04-09 RX ADMIN — MEPIVACAINE HYDROCHLORIDE 60 MG: 20 INJECTION, SOLUTION EPIDURAL; INFILTRATION at 08:16

## 2024-04-09 RX ADMIN — HYDROMORPHONE HYDROCHLORIDE 2 MG: 2 TABLET ORAL at 22:58

## 2024-04-09 RX ADMIN — TRANEXAMIC ACID 1000 MG: 100 INJECTION, SOLUTION INTRAVENOUS at 08:30

## 2024-04-09 RX ADMIN — SODIUM CHLORIDE, PRESERVATIVE FREE 10 ML: 5 INJECTION INTRAVENOUS at 22:58

## 2024-04-09 RX ADMIN — FENTANYL CITRATE 25 MCG: 50 INJECTION, SOLUTION INTRAMUSCULAR; INTRAVENOUS at 08:18

## 2024-04-09 RX ADMIN — CEFAZOLIN 2000 MG: 10 INJECTION, POWDER, FOR SOLUTION INTRAVENOUS at 16:04

## 2024-04-09 RX ADMIN — PREGABALIN 200 MG: 150 CAPSULE ORAL at 19:43

## 2024-04-09 RX ADMIN — HYDROMORPHONE HYDROCHLORIDE 1 MG: 1 INJECTION, SOLUTION INTRAMUSCULAR; INTRAVENOUS; SUBCUTANEOUS at 14:12

## 2024-04-09 RX ADMIN — HYDROMORPHONE HYDROCHLORIDE 1 MG: 1 INJECTION, SOLUTION INTRAMUSCULAR; INTRAVENOUS; SUBCUTANEOUS at 18:03

## 2024-04-09 RX ADMIN — OXYCODONE 10 MG: 5 TABLET ORAL at 16:04

## 2024-04-09 RX ADMIN — DOCUSATE SODIUM 50MG AND SENNOSIDES 8.6MG 1 TABLET: 8.6; 5 TABLET, FILM COATED ORAL at 19:43

## 2024-04-09 RX ADMIN — HYDROMORPHONE HYDROCHLORIDE 0.5 MG: 1 INJECTION, SOLUTION INTRAMUSCULAR; INTRAVENOUS; SUBCUTANEOUS at 11:12

## 2024-04-09 RX ADMIN — OXYCODONE 5 MG: 5 TABLET ORAL at 11:47

## 2024-04-09 RX ADMIN — PHENYLEPHRINE HYDROCHLORIDE 150 MCG: 0.1 INJECTION, SOLUTION INTRAVENOUS at 10:13

## 2024-04-09 RX ADMIN — ALBUTEROL SULFATE 2.5 MG: 2.5 SOLUTION RESPIRATORY (INHALATION) at 07:20

## 2024-04-09 RX ADMIN — HYDROMORPHONE HYDROCHLORIDE 0.5 MG: 1 INJECTION, SOLUTION INTRAMUSCULAR; INTRAVENOUS; SUBCUTANEOUS at 10:38

## 2024-04-09 RX ADMIN — PROPOFOL 50 MG: 10 INJECTION, EMULSION INTRAVENOUS at 08:23

## 2024-04-09 RX ADMIN — CLONAZEPAM 1 MG: 1 TABLET ORAL at 20:11

## 2024-04-09 RX ADMIN — METHOCARBAMOL TABLETS 750 MG: 750 TABLET, COATED ORAL at 13:37

## 2024-04-09 RX ADMIN — ASPIRIN 81 MG: 81 TABLET, COATED ORAL at 19:43

## 2024-04-09 RX ADMIN — FENTANYL CITRATE 25 MCG: 50 INJECTION, SOLUTION INTRAMUSCULAR; INTRAVENOUS at 08:16

## 2024-04-09 RX ADMIN — LIDOCAINE HYDROCHLORIDE 1 ML: 10 INJECTION, SOLUTION INFILTRATION; PERINEURAL at 07:13

## 2024-04-09 RX ADMIN — Medication 2000 MG: at 08:25

## 2024-04-09 RX ADMIN — MORPHINE SULFATE 2 MG: 2 INJECTION, SOLUTION INTRAMUSCULAR; INTRAVENOUS at 21:09

## 2024-04-09 RX ADMIN — PROPOFOL 100 MCG/KG/MIN: 10 INJECTION, EMULSION INTRAVENOUS at 08:24

## 2024-04-09 RX ADMIN — MORPHINE SULFATE 2 MG: 2 INJECTION, SOLUTION INTRAMUSCULAR; INTRAVENOUS at 22:57

## 2024-04-09 RX ADMIN — CLONAZEPAM 1 MG: 1 TABLET ORAL at 16:04

## 2024-04-09 RX ADMIN — PHENYLEPHRINE HYDROCHLORIDE 100 MCG: 0.1 INJECTION, SOLUTION INTRAVENOUS at 08:25

## 2024-04-09 RX ADMIN — LIDOCAINE HYDROCHLORIDE 100 MG: 20 INJECTION, SOLUTION EPIDURAL; INFILTRATION; INTRACAUDAL; PERINEURAL at 08:23

## 2024-04-09 RX ADMIN — OXYCODONE 10 MG: 5 TABLET ORAL at 19:29

## 2024-04-09 RX ADMIN — HYDROMORPHONE HYDROCHLORIDE 0.5 MG: 1 INJECTION, SOLUTION INTRAMUSCULAR; INTRAVENOUS; SUBCUTANEOUS at 11:17

## 2024-04-09 RX ADMIN — PHENYLEPHRINE HYDROCHLORIDE 50 MCG/MIN: 10 INJECTION INTRAVENOUS at 08:25

## 2024-04-09 RX ADMIN — TRANEXAMIC ACID 1000 MG: 100 INJECTION, SOLUTION INTRAVENOUS at 09:38

## 2024-04-09 RX ADMIN — SODIUM CHLORIDE, POTASSIUM CHLORIDE, SODIUM LACTATE AND CALCIUM CHLORIDE: 600; 310; 30; 20 INJECTION, SOLUTION INTRAVENOUS at 07:13

## 2024-04-09 RX ADMIN — PROCHLORPERAZINE EDISYLATE 10 MG: 5 INJECTION INTRAMUSCULAR; INTRAVENOUS at 22:57

## 2024-04-09 ASSESSMENT — PAIN DESCRIPTION - PAIN TYPE
TYPE: SURGICAL PAIN

## 2024-04-09 ASSESSMENT — PAIN SCALES - GENERAL
PAINLEVEL_OUTOF10: 9
PAINLEVEL_OUTOF10: 10
PAINLEVEL_OUTOF10: 7
PAINLEVEL_OUTOF10: 8
PAINLEVEL_OUTOF10: 7
PAINLEVEL_OUTOF10: 3
PAINLEVEL_OUTOF10: 10
PAINLEVEL_OUTOF10: 10
PAINLEVEL_OUTOF10: 8
PAINLEVEL_OUTOF10: 9
PAINLEVEL_OUTOF10: 10
PAINLEVEL_OUTOF10: 4
PAINLEVEL_OUTOF10: 9
PAINLEVEL_OUTOF10: 7

## 2024-04-09 ASSESSMENT — PAIN DESCRIPTION - DESCRIPTORS
DESCRIPTORS: ACHING;HEAVINESS;BURNING
DESCRIPTORS: ACHING;BURNING
DESCRIPTORS: ACHING
DESCRIPTORS: ACHING;BURNING;HEAVINESS
DESCRIPTORS: ACHING
DESCRIPTORS: ACHING
DESCRIPTORS: ACHING;THROBBING

## 2024-04-09 ASSESSMENT — PAIN DESCRIPTION - ORIENTATION
ORIENTATION: RIGHT

## 2024-04-09 ASSESSMENT — PAIN DESCRIPTION - LOCATION
LOCATION: HIP

## 2024-04-09 ASSESSMENT — COPD QUESTIONNAIRES: CAT_SEVERITY: MODERATE

## 2024-04-09 ASSESSMENT — PAIN - FUNCTIONAL ASSESSMENT: PAIN_FUNCTIONAL_ASSESSMENT: 0-10

## 2024-04-09 ASSESSMENT — LIFESTYLE VARIABLES: SMOKING_STATUS: 1

## 2024-04-09 NOTE — CARE COORDINATION
Patient is a 63 y.o. year old female admitted for Right MICHAEL . Patient plans to return home on discharge. Order received to arrange home health. Patient without preference towards agency. Referral sent to [facility]. Patient has a walker and her sister purchased a 3-in-1  bedside commode. Pt without preference towards provider. Referral sent to [employer] [facility]. Sister made payment arrangements directly  with [employer].  Equipment delivered to the hospital room prior to discharge. Will follow until discharge.      04/09/24 7606   Service Assessment   Patient Orientation Alert and Oriented   Cognition Alert   History Provided By Patient   Primary Caregiver Self   Services At/After Discharge   Transition of Care Consult (CM Consult) Home Health   Internal Home Health Yes   Services At/After Discharge Home Health;PT   Mode of Transport at Discharge Self   Confirm Follow Up Transport Self   Condition of Participation: Discharge Planning   The Plan for Transition of Care is related to the following treatment goals: improve mobility   The Patient and/or Patient Representative was provided with a Choice of Provider? Patient   The Patient and/Or Patient Representative agree with the Discharge Plan? Yes   Freedom of Choice list was provided with basic dialogue that supports the patient's individualized plan of care/goals, treatment preferences, and shares the quality data associated with the providers?  Yes

## 2024-04-09 NOTE — ANESTHESIA POSTPROCEDURE EVALUATION
Department of Anesthesiology  Postprocedure Note    Patient: Olive Mireles  MRN: [id number]  YOB: 1960  Date of evaluation: 4/9/2024    Procedure Summary       Date: 04/09/24 Room / Location: [facility] MAIN OR 01 / [facility] MAIN OR    Anesthesia Start: 0804 Anesthesia Stop: 1023    Procedure: rt HIP TOTAL ARTHROPLASTY/ SDD (Right: Hip) Diagnosis:       Primary osteoarthritis of right hip      (Primary osteoarthritis of right hip [M16.11])    Surgeons: Sean Bhatti Jr., MD Responsible Provider: Omari Lopez Jr., MD    Anesthesia Type: spinal ASA Status: 3            Anesthesia Type: No value filed.    Saul Phase I: Saul Score: 7    Saul Phase II:      Anesthesia Post Evaluation    Patient location during evaluation: PACU  Patient participation: complete - patient participated  Level of consciousness: awake and alert  Pain score: 2  Airway patency: patent  Nausea & Vomiting: no nausea and no vomiting  Cardiovascular status: blood pressure returned to baseline  Respiratory status: acceptable  Hydration status: euvolemic  Comments: BP 90/61   Pulse 57   Temp 98.7 °F (37.1 °C) (Temporal)   Resp 16   Ht 1.702 m (5' 7.01\")   Wt 62.5 kg (137 lb 11.2 oz)   SpO2 100%   BMI 21.56 kg/m²   Multimodal analgesia pain management approach  Pain management: satisfactory to patient    No notable events documented.

## 2024-04-09 NOTE — INTERVAL H&P NOTE
Update History & Physical    The patient's History and Physical of April 2, 2024 was reviewed with the patient and I examined the patient. There was no change. The surgical site was confirmed by the patient and me.     Plan: The risks, benefits, expected outcome, and alternative to the recommended procedure have been discussed with the patient. Patient understands and wants to proceed with the procedure.     Electronically signed by CITLALLI CERVANTES JR, MD on 4/9/2024 at 6:40 AM

## 2024-04-09 NOTE — ANESTHESIA PRE PROCEDURE
Department of Anesthesiology  Preprocedure Note       Name:  Olive Mireles   Age:  63 y.o.  :  1960                                          MRN:  [id number]         Date:  2024      Surgeon: Surgeon(s):  Sean Bhatti Jr., MD    Procedure: Procedure(s):  rt HIP TOTAL ARTHROPLASTY/ SDD    Medications prior to admission:   Prior to Admission medications    Medication Sig Start Date End Date Taking? Authorizing Provider   acetaminophen (TYLENOL) 500 MG tablet Take 2 tablets by mouth every 6 hours as needed for Pain   Yes Jono Oleary MD   albuterol sulfate HFA (PROVENTIL;VENTOLIN;PROAIR) 108 (90 Base) MCG/ACT inhaler Inhale 1-2 puffs into the lungs every 4 hours as needed 23   Jono Oleary MD   vitamin D (CHOLECALCIFEROL) 25 MCG (1000 UT) TABS tablet Take 1 tablet by mouth daily 23   Jono Oleary MD   clonazePAM (KLONOPIN) 1 MG disintegrating tablet Take 1 tablet by mouth 2 times daily as needed for Anxiety.    Jono Oleary MD   desvenlafaxine succinate (PRISTIQ) 100 MG TB24 extended release tablet Take 1 tablet by mouth daily  Patient not taking: Reported on 2024    Jono Oleary MD   DULoxetine (CYMBALTA) 30 MG extended release capsule Take 1 capsule by mouth daily    Jono Oleary MD   hydrOXYzine HCl (ATARAX) 25 MG tablet Take 1 tablet by mouth every 4 hours as needed for Itching  Patient not taking: Reported on 2024   Jono Oleary MD   levothyroxine (SYNTHROID) 125 MCG tablet Take 1 tablet by mouth daily    Jono Oleary MD   meloxicam (MOBIC) 15 MG tablet Take 1 tablet by mouth as needed for Pain 10/8/18   Jono Oleary MD   nicotine polacrilex (COMMIT) 4 MG lozenge 1 lozenge every 2 hours as needed for Smoking cessation  Patient not taking: Reported on 2024 3/12/24   Jono Oleary MD   nicotine (NICODERM CQ) 14 MG/24HR Place 1 patch onto the skin every 24 hours  Patient not taking:

## 2024-04-09 NOTE — ANESTHESIA PROCEDURE NOTES
Spinal Block    Patient location during procedure: OR  End time: 4/9/2024 8:20 AM  Reason for block: primary anesthetic  Staffing  Performed: anesthesiologist   Anesthesiologist: Omari Lopez Jr., MD  Performed by: Omari Lopez Jr., MD  Authorized by: Omari Lopez Jr., MD    Spinal Block  Patient position: sitting  Prep: ChloraPrep  Patient monitoring: cardiac monitor, continuous pulse ox, frequent blood pressure checks and oxygen  Approach: midline  Location: L2/L3  Provider prep: mask and sterile gloves  Local infiltration: lidocaine  Needle  Needle type: Quincke   Needle gauge: 22 G  Needle length: 4 in  Needle insertion depth: 3 cm  Assessment  Sensory level: T6  Swirl obtained: Yes  CSF: clear  Attempts: 1  Hemodynamics: stable  Preanesthetic Checklist  Completed: patient identified, IV checked, site marked, risks and benefits discussed, surgical/procedural consents, equipment checked, pre-op evaluation, timeout performed, anesthesia consent given, oxygen available, monitors applied/VS acknowledged, fire risk safety assessment completed and verbalized and blood product R/B/A discussed and consented

## 2024-04-09 NOTE — RT PROTOCOL NOTE
to anticipated discharge.   K.  Ventilator Weaning Protocol indications:         1. Patient's mechanically ventilated          2. Managed by intensivist  L. Lung Volume Expansion Protocol indications:        1. Any patient at risk for pulmonary complications.    VI. Maintenance:    Timely patient assessment is an integral part of this protocol therefore the following will be applied:  All non- critical care patients will be evaluated upon receiving initial respiratory care orders within 24 hours and re-evaluated within 48 hours (or more as needed).  Orders requesting a Respiratory Consult will be responded to in the following manner:  In patient emergency situations, the RCP assigned to the floor will respond immediately to the patient, provide an initial respiratory assessment, and contact the patient's physician as necessary for appropriate orders.  In non-emergent situations, the RCP assigned to the floor will respond to the patient within 90 minutes and provide an initial respiratory assessment and contact patient's physician as necessary for appropriate orders.  An RCP will provide a comprehensive assessment as soon as possible.  Upon completion of an evaluation, the RCP will complete documentation in the patient's EMR in the RT Assessment section.  The RCP who completes the assessment will document orders for therapy in the “orders section” of the patient's EMR selecting “new order”. Next, “per protocol” should be selected indicating it is a protocol order and “sign orders” should be selected to complete the process. The applicable protocol must be added to the progress note per Joint Commission guidelines.  The Pharmacy and Therapeutics (P&T) Committee has mandated that the medication Xopenex may be changed to unit dose albuterol without an order, except for those patients receiving Xopenex due to cardiac arrhythmias.   The dosage for these patients should be 0.63 mg. and may be changed from 1.25 mg. to 0.63

## 2024-04-09 NOTE — OP NOTE
The acetabular component was impacted at approximately 40 degrees horizontal tilt and 20 degrees anteversion. One 6.5 mm screw was used to fixate the cup. The real polyethylene liner was impacted into position.     Utilizing the femoral retractor, the canal was prepared with appropriate lateralization and reamed with the starter reamer. The canal was then broached progressively to accommodate the Omar insignia stem. The broach was positioned with the anatomic femoral anteversion. A calcar planar was utilized. Trials were preformed using various neck length heads until leg length had been restored. The hip was carried through a range of motion and was found to be stable to flexion greater than 90 degrees and on internal and external rotation. Limb lengths were thought to be equilibrated and with appropriate stability as mentioned above. All trial components were removed.     The cementless permanent stem was impacted into place. A trial reduction was performed and the above neck length was selected.   The permanent ceramic femoral head was impacted into place. The hip was reduced and the stability was as mentioned as above. Copious irrigation was accomplished about the surgical site. The sciatic nerve was palpated and noted to be intact. The capsule was repaired with an absorbable suture and the external rotators were reattached with a #5 Mersilene. The operative hip was injected with 60 cc of Neuropin and 1 cc of 30 mg of Toradol.   The Hip was then soaked with a diluted betadine solution for approximately 3 minutes and then thoroughly irrigated.   A #1 Stratafix suture was used to re-approximate the fascia.Then, 1 gram (100 mg/ml) of Transexamic Acid was injected into the joint space.  An insorb stapler with absorbable sutures were used to approximate the subcutaneous layers. Staples were applied in an occlusive fashion and a sterile bandage was placed. The patient was then rolled to a supine position. The

## 2024-04-09 NOTE — PERIOP NOTE
TRANSFER - OUT REPORT:    Verbal report given to JAMARCUS Shah on Olive Mireles  being transferred to [facility] for routine post-op       Report consisted of patient's Situation, Background, Assessment and   Recommendations(SBAR).     Information from the following report(s) Nurse Handoff Report and Cardiac Rhythm SB  was reviewed with the receiving nurse.           Lines:   Peripheral IV 04/09/24 Left;Posterior Hand (Active)   Site Assessment Clean, dry & intact 04/09/24 1018   Line Status Infusing 04/09/24 1018   Phlebitis Assessment No symptoms 04/09/24 1018   Infiltration Assessment 0 04/09/24 1018   Alcohol Cap Used No 04/09/24 1018   Dressing Status Clean, dry & intact 04/09/24 1018   Dressing Type Transparent 04/09/24 1018        Opportunity for questions and clarification was provided.      Patient transported with:  O2 @ room air lpm

## 2024-04-09 NOTE — PERIOP NOTE
Dr. Lopez at bedside in PACU.  Pt reports 7/10 pain.  MD states no more medications to be given in PACU, patient can go upstairs to ortho when current dilaudid time is up.  Pt verbalizes understanding.

## 2024-04-09 NOTE — DISCHARGE INSTRUCTIONS
[facility]   Patient Discharge Instructions    Olive Mireles / [id number] : 1960    Admitted 2024 Discharged: 2024     IF YOU HAVE ANY PROBLEMS ONCE YOU ARE AT HOME CALL THE FOLLOWING NUMBERS:   Nurse's line: [phone]  Main office number: [phone]      Medications    The medications you are to continue on are listed on the medication reconciliation sheet.   Narcotic pain medications as well as supplemental iron can cause constipation. If this occurs, try over the counter stool softeners or try stopping the narcotic pain medication and/or the iron.   It is important that you take the medication exactly as they are prescribed.  Medications which increase your risk of blood clots are listed to stop for 5 weeks after surgery as well as medications or supplements which increase your risk of bleeding complications.   Keep your medication in the bottles provided by the pharmacist and keep a list of the medication names, dosages, and times to be taken in your wallet.   Do not take other medications without consulting your doctor.  If you need a refill on your pain medication, please note our office is closed over the weekend. It is our office policy that on-call providers cannot refill narcotic pain medications over the weekend. If you will need a refill over the weekend, please call our office before 12pm on Friday or first thing Monday morning.        Important Information    Do NOT smoke as this will greatly increase your risk of infection!    Resume your prehospital diet. If you have excessive nausea or vomitting call your doctor's office     Leg swelling and warmth is normal for 6 months after surgery. If you experience swelling in your leg, elevate your leg while laying down with your toes above your heart. If you have sudden onset severe swelling with leg pain call our office. Use Christiano Hose stockings until we see you in the office for your follow up appointment.    The

## 2024-04-10 LAB
HCT VFR BLD AUTO: 35.9 % (ref 35.8–46.3)
HGB BLD-MCNC: 11.4 G/DL (ref 11.7–15.4)

## 2024-04-10 PROCEDURE — 36415 COLL VENOUS BLD VENIPUNCTURE: CPT

## 2024-04-10 PROCEDURE — 85014 HEMATOCRIT: CPT

## 2024-04-10 PROCEDURE — 6370000000 HC RX 637 (ALT 250 FOR IP): Performed by: PHYSICIAN ASSISTANT

## 2024-04-10 PROCEDURE — 6360000002 HC RX W HCPCS: Performed by: PHYSICIAN ASSISTANT

## 2024-04-10 PROCEDURE — 97535 SELF CARE MNGMENT TRAINING: CPT

## 2024-04-10 PROCEDURE — 2500000003 HC RX 250 WO HCPCS: Performed by: ORTHOPAEDIC SURGERY

## 2024-04-10 PROCEDURE — 97530 THERAPEUTIC ACTIVITIES: CPT

## 2024-04-10 PROCEDURE — 6370000000 HC RX 637 (ALT 250 FOR IP): Performed by: ORTHOPAEDIC SURGERY

## 2024-04-10 PROCEDURE — 85018 HEMOGLOBIN: CPT

## 2024-04-10 PROCEDURE — 2580000003 HC RX 258: Performed by: PHYSICIAN ASSISTANT

## 2024-04-10 RX ORDER — KETOROLAC TROMETHAMINE 30 MG/ML
30 INJECTION, SOLUTION INTRAMUSCULAR; INTRAVENOUS EVERY 6 HOURS PRN
Status: DISCONTINUED | OUTPATIENT
Start: 2024-04-10 | End: 2024-04-11 | Stop reason: HOSPADM

## 2024-04-10 RX ADMIN — HYDROMORPHONE HYDROCHLORIDE 2 MG: 2 TABLET ORAL at 07:42

## 2024-04-10 RX ADMIN — HYDROMORPHONE HYDROCHLORIDE 2 MG: 2 TABLET ORAL at 18:39

## 2024-04-10 RX ADMIN — PREGABALIN 200 MG: 150 CAPSULE ORAL at 07:42

## 2024-04-10 RX ADMIN — KETOROLAC TROMETHAMINE 30 MG: 30 INJECTION, SOLUTION INTRAMUSCULAR; INTRAVENOUS at 05:40

## 2024-04-10 RX ADMIN — HYDROMORPHONE HYDROCHLORIDE 2 MG: 2 TABLET ORAL at 22:19

## 2024-04-10 RX ADMIN — TUBERCULIN PURIFIED PROTEIN DERIVATIVE 5 UNITS: 5 INJECTION, SOLUTION INTRADERMAL at 12:19

## 2024-04-10 RX ADMIN — SODIUM CHLORIDE, PRESERVATIVE FREE 10 ML: 5 INJECTION INTRAVENOUS at 20:15

## 2024-04-10 RX ADMIN — DOCUSATE SODIUM 50MG AND SENNOSIDES 8.6MG 1 TABLET: 8.6; 5 TABLET, FILM COATED ORAL at 07:42

## 2024-04-10 RX ADMIN — ASPIRIN 81 MG: 81 TABLET, COATED ORAL at 20:15

## 2024-04-10 RX ADMIN — CEFAZOLIN 2000 MG: 10 INJECTION, POWDER, FOR SOLUTION INTRAVENOUS at 01:01

## 2024-04-10 RX ADMIN — MORPHINE SULFATE 2 MG: 2 INJECTION, SOLUTION INTRAMUSCULAR; INTRAVENOUS at 05:40

## 2024-04-10 RX ADMIN — METHOCARBAMOL TABLETS 750 MG: 750 TABLET, COATED ORAL at 15:10

## 2024-04-10 RX ADMIN — ASPIRIN 81 MG: 81 TABLET, COATED ORAL at 07:42

## 2024-04-10 RX ADMIN — HYDROMORPHONE HYDROCHLORIDE 2 MG: 2 TABLET ORAL at 15:10

## 2024-04-10 RX ADMIN — METHOCARBAMOL TABLETS 750 MG: 750 TABLET, COATED ORAL at 01:00

## 2024-04-10 RX ADMIN — METHOCARBAMOL TABLETS 750 MG: 750 TABLET, COATED ORAL at 07:10

## 2024-04-10 RX ADMIN — MORPHINE SULFATE 2 MG: 2 INJECTION, SOLUTION INTRAMUSCULAR; INTRAVENOUS at 02:00

## 2024-04-10 RX ADMIN — KETOROLAC TROMETHAMINE 30 MG: 30 INJECTION, SOLUTION INTRAMUSCULAR; INTRAVENOUS at 18:39

## 2024-04-10 RX ADMIN — CLONAZEPAM 1 MG: 1 TABLET ORAL at 07:10

## 2024-04-10 RX ADMIN — HYDROMORPHONE HYDROCHLORIDE 2 MG: 2 TABLET ORAL at 04:11

## 2024-04-10 RX ADMIN — DOCUSATE SODIUM 50MG AND SENNOSIDES 8.6MG 1 TABLET: 8.6; 5 TABLET, FILM COATED ORAL at 20:15

## 2024-04-10 ASSESSMENT — PAIN DESCRIPTION - PAIN TYPE: TYPE: SURGICAL PAIN

## 2024-04-10 ASSESSMENT — PAIN SCALES - GENERAL
PAINLEVEL_OUTOF10: 9
PAINLEVEL_OUTOF10: 7
PAINLEVEL_OUTOF10: 9
PAINLEVEL_OUTOF10: 4
PAINLEVEL_OUTOF10: 7
PAINLEVEL_OUTOF10: 6
PAINLEVEL_OUTOF10: 10
PAINLEVEL_OUTOF10: 6
PAINLEVEL_OUTOF10: 10
PAINLEVEL_OUTOF10: 8

## 2024-04-10 ASSESSMENT — PAIN DESCRIPTION - LOCATION
LOCATION: HIP
LOCATION: LEG;HIP
LOCATION: LEG;HIP

## 2024-04-10 ASSESSMENT — PAIN DESCRIPTION - DESCRIPTORS
DESCRIPTORS: ACHING
DESCRIPTORS: ACHING;BURNING;CRAMPING
DESCRIPTORS: BURNING
DESCRIPTORS: ACHING
DESCRIPTORS: ACHING
DESCRIPTORS: ACHING;CRAMPING;HEAVINESS

## 2024-04-10 ASSESSMENT — PAIN DESCRIPTION - ORIENTATION
ORIENTATION: RIGHT

## 2024-04-10 NOTE — CARE COORDINATION
PT and OT feel short term rehab is needed. Pt lives alone with the help of her [facility] aide that lives two doors down from her.  Aide helps M-F 3-4  hrs a day.  Pt agreeable to our recommendations. She requested placement in the [place] area.  She has prior stays at [facility] and [facility].  Two referrals sent to [facility] and [facility] in [place].  PPD placed today. Will follow for offers.  [employer] precert will be required.

## 2024-04-11 VITALS
HEIGHT: 67 IN | OXYGEN SATURATION: 92 % | RESPIRATION RATE: 16 BRPM | HEART RATE: 84 BPM | BODY MASS INDEX: 21.61 KG/M2 | DIASTOLIC BLOOD PRESSURE: 75 MMHG | WEIGHT: 137.7 LBS | TEMPERATURE: 97.5 F | SYSTOLIC BLOOD PRESSURE: 118 MMHG

## 2024-04-11 LAB
BACTERIA SPEC CULT: NORMAL
GRAM STN SPEC: NORMAL
GRAM STN SPEC: NORMAL
HCT VFR BLD AUTO: 32.6 % (ref 35.8–46.3)
HGB BLD-MCNC: 10.6 G/DL (ref 11.7–15.4)
SERVICE CMNT-IMP: NORMAL

## 2024-04-11 PROCEDURE — 36415 COLL VENOUS BLD VENIPUNCTURE: CPT

## 2024-04-11 PROCEDURE — 99024 POSTOP FOLLOW-UP VISIT: CPT | Performed by: ORTHOPAEDIC SURGERY

## 2024-04-11 PROCEDURE — 6370000000 HC RX 637 (ALT 250 FOR IP): Performed by: PHYSICIAN ASSISTANT

## 2024-04-11 PROCEDURE — 6370000000 HC RX 637 (ALT 250 FOR IP): Performed by: ORTHOPAEDIC SURGERY

## 2024-04-11 PROCEDURE — 97535 SELF CARE MNGMENT TRAINING: CPT

## 2024-04-11 PROCEDURE — 6360000002 HC RX W HCPCS: Performed by: PHYSICIAN ASSISTANT

## 2024-04-11 PROCEDURE — 85018 HEMOGLOBIN: CPT

## 2024-04-11 PROCEDURE — 2580000003 HC RX 258: Performed by: PHYSICIAN ASSISTANT

## 2024-04-11 PROCEDURE — 97530 THERAPEUTIC ACTIVITIES: CPT

## 2024-04-11 PROCEDURE — 85014 HEMATOCRIT: CPT

## 2024-04-11 RX ORDER — LEVOTHYROXINE SODIUM 0.12 MG/1
125 TABLET ORAL
Status: DISCONTINUED | OUTPATIENT
Start: 2024-04-11 | End: 2024-04-11 | Stop reason: HOSPADM

## 2024-04-11 RX ORDER — HYDROMORPHONE HYDROCHLORIDE 2 MG/1
2 TABLET ORAL EVERY 4 HOURS PRN
Qty: 30 TABLET | Refills: 0 | Status: SHIPPED | OUTPATIENT
Start: 2024-04-11 | End: 2024-04-16

## 2024-04-11 RX ADMIN — SODIUM CHLORIDE, PRESERVATIVE FREE 10 ML: 5 INJECTION INTRAVENOUS at 08:12

## 2024-04-11 RX ADMIN — HYDROMORPHONE HYDROCHLORIDE 2 MG: 2 TABLET ORAL at 11:16

## 2024-04-11 RX ADMIN — ASPIRIN 81 MG: 81 TABLET, COATED ORAL at 08:06

## 2024-04-11 RX ADMIN — HYDROMORPHONE HYDROCHLORIDE 2 MG: 2 TABLET ORAL at 06:02

## 2024-04-11 RX ADMIN — METHOCARBAMOL TABLETS 750 MG: 750 TABLET, COATED ORAL at 01:59

## 2024-04-11 RX ADMIN — TRAMADOL HYDROCHLORIDE 100 MG: 50 TABLET ORAL at 08:05

## 2024-04-11 RX ADMIN — KETOROLAC TROMETHAMINE 30 MG: 30 INJECTION, SOLUTION INTRAMUSCULAR; INTRAVENOUS at 08:08

## 2024-04-11 RX ADMIN — PREGABALIN 200 MG: 150 CAPSULE ORAL at 08:06

## 2024-04-11 RX ADMIN — HYDROMORPHONE HYDROCHLORIDE 2 MG: 2 TABLET ORAL at 01:59

## 2024-04-11 RX ADMIN — DOCUSATE SODIUM 50MG AND SENNOSIDES 8.6MG 1 TABLET: 8.6; 5 TABLET, FILM COATED ORAL at 08:05

## 2024-04-11 RX ADMIN — CLONAZEPAM 1 MG: 1 TABLET ORAL at 08:06

## 2024-04-11 ASSESSMENT — PAIN DESCRIPTION - ORIENTATION
ORIENTATION: RIGHT

## 2024-04-11 ASSESSMENT — PAIN DESCRIPTION - LOCATION
LOCATION: HIP

## 2024-04-11 ASSESSMENT — PAIN SCALES - GENERAL
PAINLEVEL_OUTOF10: 7
PAINLEVEL_OUTOF10: 0
PAINLEVEL_OUTOF10: 7
PAINLEVEL_OUTOF10: 0
PAINLEVEL_OUTOF10: 7
PAINLEVEL_OUTOF10: 4
PAINLEVEL_OUTOF10: 8

## 2024-04-11 ASSESSMENT — PAIN DESCRIPTION - PAIN TYPE
TYPE: SURGICAL PAIN
TYPE: SURGICAL PAIN

## 2024-04-11 ASSESSMENT — PAIN DESCRIPTION - DESCRIPTORS
DESCRIPTORS: ACHING

## 2024-04-11 NOTE — PROGRESS NOTES
04/09/24 2149   Oxygen Therapy/Pulse Ox   O2 Therapy Room air   Pulse 86   Respirations 18   SpO2 93 %   Pulse Oximeter Device Mode Continuous   $Pulse Oximeter $Spot check (multiple/continuous)     Patient placed on continuous sat monitor. Data cleared and alarms set. No distress noted at this time.   
ACUTE PHYSICAL THERAPY GOALS:   (Developed with and agreed upon by patient and/or caregiver.)  GOALS (1-4 days):  (1.)Ms. Mireles will move from supine to sit and sit to supine  in bed with STAND BY ASSIST.    (2.)Ms. Mireles will transfer from bed to chair and chair to bed with STAND BY ASSIST using the least restrictive device.    (3.)Ms. Mireles will ambulate with STAND BY ASSIST for 300 feet with the least restrictive device.   (4.)Ms. Mireles will ambulate up/down 6 steps with right railing with CONTACT GUARD ASSIST.  (5.)Ms. Mireles will state/observe MICHAEL precautions with 0 verbal cues.  ________________________________________________________________________________________________     PHYSICAL THERAPY: TOTAL HIP ARTHROPLASTY Daily Note and AM  (Link to Caseload Tracking: PT Visit Days : 2  Acknowledge Orders  Time In/Out  PT Charge Capture  Rehab Caseload Tracker  Episode   Olive Mireles is a 63 y.o. female   PRIMARY DIAGNOSIS: Status post right hip replacement  Primary osteoarthritis of right hip [M16.11]  Procedure(s) (LRB):  rt HIP TOTAL ARTHROPLASTY/ SDD (Right)  1 Day Post-Op  Reason for Referral: Pain in Right Hip (M25.551)  Stiffness of Right Hip, Not elsewhere classified (M25.651)  Difficulty in walking, Not elsewhere classified (R26.2)  Outpatient in a bed: Payor: [employer] MEDICARE / Plan: [employer] CHOICE-PPO MEDICARE / Product Type: *No Product type* /     REHAB RECOMMENDATIONS:   Recommendation to date pending progress:  Setting:  Home Health Therapy    Equipment:    None     GAIT: I Mod I S SBA CGA Min Mod Max Total  NT x2 Comments:   Level of Assistance [] [] [] [] [] [x] [] [] [] [] []    Weightbearing Status  wbatRight Lower Extremity Weight Bearing: Weight Bearing As Tolerated    Distance  30 feet    Gait Quality Antalgic, Decreased rina , Decreased step clearance, Decreased step length, and Decreased stance    DME Rolling Walker     Stairs      Ramp     I=Independent, Mod I=Modified Independent, 
ACUTE PHYSICAL THERAPY GOALS:   (Developed with and agreed upon by patient and/or caregiver.)  GOALS (1-4 days):  (1.)Ms. Mireles will move from supine to sit and sit to supine  in bed with STAND BY ASSIST.    (2.)Ms. Mireles will transfer from bed to chair and chair to bed with STAND BY ASSIST using the least restrictive device.    (3.)Ms. Mireles will ambulate with STAND BY ASSIST for 300 feet with the least restrictive device.   (4.)Ms. Mireles will ambulate up/down 6 steps with right railing with CONTACT GUARD ASSIST.  (5.)Ms. Mireles will state/observe MICHAEL precautions with 0 verbal cues.  ________________________________________________________________________________________________     PHYSICAL THERAPY: TOTAL HIP ARTHROPLASTY Daily Note and PM  (Link to Caseload Tracking: PT Visit Days : 2  Acknowledge Orders  Time In/Out  PT Charge Capture  Rehab Caseload Tracker  Episode   Olive Mireles is a 63 y.o. female   PRIMARY DIAGNOSIS: Status post right hip replacement  Primary osteoarthritis of right hip [M16.11]  Procedure(s) (LRB):  rt HIP TOTAL ARTHROPLASTY/ SDD (Right)  1 Day Post-Op  Reason for Referral: Pain in Right Hip (M25.551)  Stiffness of Right Hip, Not elsewhere classified (M25.651)  Difficulty in walking, Not elsewhere classified (R26.2)  Outpatient in a bed: Payor: [employer] MEDICARE / Plan: [employer] CHOICE-PPO MEDICARE / Product Type: *No Product type* /     REHAB RECOMMENDATIONS:   Recommendation to date pending progress:  Setting:  Home Health Therapy    Equipment:    None     GAIT: I Mod I S SBA CGA Min Mod Max Total  NT x2 Comments:   Level of Assistance [] [] [] [] [] [x] [] [] [] [] []    Weightbearing Status  wbatRight Lower Extremity Weight Bearing: Weight Bearing As Tolerated    Distance  30 feet    Gait Quality Antalgic, Decreased rina , Decreased step clearance, Decreased step length, and Decreased stance    DME Rolling Walker     Stairs      Ramp     I=Independent, Mod I=Modified Independent, 
OCCUPATIONAL THERAPY Daily Note and AM      (Link to Caseload Tracking: OT Visit Days: 2/2  OT Orders   Time  OT Charge Capture  Rehab Caseload Tracker  Episode     Olive Mireles is a 63 y.o. female   PRIMARY DIAGNOSIS: Status post right hip replacement  Primary osteoarthritis of right hip [M16.11]  Procedure(s) (LRB):  rt HIP TOTAL ARTHROPLASTY/ SDD (Right)  1 Day Post-Op  Reason for Referral: Pain in Right Hip (M25.551)  Stiffness of Right Hip, Not elsewhere classified (M25.651)  Generalized Muscle Weakness (M62.81)  Other lack of cordination (R27.8)  Difficulty in walking, Not elsewhere classified (R26.2)  Other abnormalities of gait and mobility (R26.89)  Outpatient in a bed: Payor: [employer] MEDICARE / Plan: [employer] / Product Type: *No Product type* /     ASSESSMENT:     REHAB RECOMMENDATIONS:   Recommendation to date pending progress:  [facility]    Equipment:    Rolling Walker     ASSESSMENT:   Ms. Mireles is s/p right MICHAEL.  She was sitting up in recliner. She stood with Cga to min assist. She ambulated slowly to the bathroom with min assist with RW. She sat on transfer tub bench and showered with min for distal LE's. She was issued a long handle sponge for home use. She donned a clean gown with sba while seated. She stood with CGA and transferred to recliner using grab bars. She dressed from recliner with min for distal LE. She stood with Cga and completed pants donning with Cga/min assist for balance during clothing management. . She returned to sit and was reclined.        [facility] AM-PAC™ “6 Clicks” Daily Activity Inpatient Short Form:     AM-PAC Daily Activity - Inpatient   How much help is needed for putting on and taking off regular lower body clothing?: A Little  How much help is needed for bathing (which includes washing, rinsing, drying)?: A Little  How much help is needed for toileting (which includes using toilet, bedpan, or urinal)?: A 
OCCUPATIONAL THERAPY Daily Note and AM      (Link to Caseload Tracking: OT Visit Days: 3/2  OT Orders   Time  OT Charge Capture  Rehab Caseload Tracker  Episode     Olive Mireles is a 63 y.o. female   PRIMARY DIAGNOSIS: Status post right hip replacement  Primary osteoarthritis of right hip [M16.11]  Procedure(s) (LRB):  rt HIP TOTAL ARTHROPLASTY/ SDD (Right)  1 Day Post-Op  Reason for Referral: Pain in Right Hip (M25.551)  Stiffness of Right Hip, Not elsewhere classified (M25.651)  Generalized Muscle Weakness (M62.81)  Other lack of cordination (R27.8)  Difficulty in walking, Not elsewhere classified (R26.2)  Other abnormalities of gait and mobility (R26.89)  Outpatient in a bed: Payor: [employer] MEDICARE / Plan: [employer] CHOICE-PPO MEDICARE / Product Type: *No Product type* /     ASSESSMENT:     REHAB RECOMMENDATIONS:   Recommendation to date pending progress:  Setting:  Short-term Rehab     Equipment:    Rolling Walker     ASSESSMENT:   Ms. Mireles is s/p right MICHAEL.  She was sitting up in recliner. She stood with Cga to min assist. She ambulated slowly to the bathroom with min assist with RW. She sat on transfer tub bench and showered with min for distal LE's. She was issued a long handle sponge for home use. She donned a clean gown with sba while seated. She stood with CGA and transferred to recliner using grab bars. She dressed from recliner with min for distal LE. She stood with Cga and completed pants donning with Cga/min assist for balance during clothing management. . She returned to sit and was reclined.     1130am patient sleeping in recliner awoke with verbal cues. She needed to use the restroom. She attempted to stand 2 twice with CGA and had to sit back down moderate cues. She ambulated to the bathroom with moderate cues and min assist. She keeps her knees flexed and needs constant cues to extend knees when stepping. She transferred to commode with moderate cues to backing up to the commode and reaching 
Orthopedic Joint Progress Note    April 10, 2024  Admit Date: 2024  Admit Diagnosis: Primary osteoarthritis of right hip [M16.11]    1 Day Post-Op    Subjective:     Olive Mireles is not doing well. Crying in room. Patient has been crying in pain since surgery despite medication changes. Unable to perform PT/OT yesterday.  Denies CP, SOB, HA.    Review of Systems: Musculoskeletal and general review of systems are unchanged    Objective:     PT/OT:     PATIENT MOBILITY                           Vital Signs:    Blood pressure 110/72, pulse 79, temperature 99.2 °F (37.3 °C), temperature source Oral, resp. rate 17, height 1.702 m (5' 7.01\"), weight 62.5 kg (137 lb 11.2 oz), SpO2 90 %.  Temp (24hrs), Av.4 °F (36.9 °C), Min:98 °F (36.7 °C), Max:99.2 °F (37.3 °C)      Pain Control:        Meds:  Current Facility-Administered Medications   Medication Dose Route Frequency    ketorolac (TORADOL) injection 30 mg  30 mg IntraVENous Q6H PRN    albuterol (PROVENTIL) (2.5 MG/3ML) 0.083% nebulizer solution 2.5 mg  2.5 mg Nebulization Q6H PRN    sodium chloride flush 0.9 % injection 5-40 mL  5-40 mL IntraVENous 2 times per day    sodium chloride flush 0.9 % injection 5-40 mL  5-40 mL IntraVENous PRN    0.9 % sodium chloride infusion   IntraVENous PRN    sodium chloride flush 0.9 % injection 5-40 mL  5-40 mL IntraVENous 2 times per day    sodium chloride flush 0.9 % injection 5-40 mL  5-40 mL IntraVENous PRN    0.9 % sodium chloride infusion   IntraVENous PRN    sennosides-docusate sodium (SENOKOT-S) 8.6-50 MG tablet 1 tablet  1 tablet Oral BID    ondansetron (ZOFRAN-ODT) disintegrating tablet 4 mg  4 mg Oral Q8H PRN    Or    ondansetron (ZOFRAN) injection 4 mg  4 mg IntraVENous Q6H PRN    diphenhydrAMINE (BENADRYL) capsule 25 mg  25 mg Oral Q6H PRN    Or    diphenhydrAMINE (BENADRYL) injection 25 mg  25 mg IntraVENous Q6H PRN    aspirin EC tablet 81 mg  81 mg Oral BID    methocarbamol (ROBAXIN) tablet 750 mg  750 mg Oral 
Patient still has uncontrolled pain, she has been crying nonstop since shift change. Messaged MD on-call, order given to give and additional dose of morphine IV and hydromorphone PO.  
Patient with uncontrolled pain and bulging at operative right hip. Notified Yesenia Corona, verbal order for xray to rule out dislocation.  
TRANSFER - IN REPORT:    Verbal report received from Tanja RN(name) on Olive Mireles  being received from pacu(unit) for routine post-op      Report consisted of patient’s Situation, Background, Assessment and   Recommendations(SBAR).     Information from the following report(s) Nurse Handoff Report was reviewed with the receiving nurse.    Opportunity for questions and clarification was provided.      Assessment completed upon patient’s arrival to unit and care assumed.          
traMADol (ULTRAM) tablet 100 mg  100 mg Oral Q6H PRN    HYDROmorphone (DILAUDID) tablet 2 mg  2 mg Oral Q3H PRN    prochlorperazine (COMPAZINE) injection 10 mg  10 mg IntraVENous Q6H PRN        LAB:    Lab Results   Component Value Date/Time    INR 1.0 04/02/2024 10:44 AM     Lab Results   Component Value Date/Time    HGB 10.6 04/11/2024 04:11 AM    HGB 11.4 04/10/2024 04:29 AM    HGB 11.9 04/09/2024 07:52 PM              Physical Exam:  Vital Signs are Stable, No Acute Distress, Alert,  Dressing is Dry,  Neurovascular exam is normal.      Assessment:      Principal Problem:    Status post right hip replacement  Active Problems:    Primary osteoarthritis of right hip  Resolved Problems:    * No resolved hospital problems. *       Plan:     Patient looks very better  Continue PT/OT/Rehab  Anticipated discharge home today if stablewith Ot  I have reviewed the patient’s controlled substance prescription history, as maintained in the [place] prescription monitoring program, so that the prescription(s) for a  controlled substance can be given.                  
  Fair with mobility and ADLs   Hand Dominance R [] L []      COGNITION/  PERCEPTION: INTACT IMPAIRED   (See Comments)   Orientation [x]     Vision [x]     Hearing [x]     Cognition  [x]     Perception []  Cues for safety     MOBILITY: I Mod I S SBA CGA Min Mod Max Total  NT x2 Comments:   Bed Mobility    Rolling [] [] [] [] [] [] [] [] [] [x] []    Supine to Sit [] [] [] [] [] [] [] [] [] [x] []    Scooting [] [] [] [] [] [] [] [] [] [x] []    Sit to Supine [] [] [] [] [] [] [] [] [] [x] []    Transfers    Sit to Stand [] [] [] [] [] [x] [] [] [] [] []    Bed to Chair [] [] [] [] [] [] [] [] [] [] []    Stand to Sit [] [] [] [] [] [x] [] [] [] [] []    Tub/Shower [] [] [] [] [] [] [] [] [] [] []       Toilet [] [] [] [] [] [x] [] [] [] [] []        [] [] [] [] [] [] [] [] [] [] []    I=Independent, Mod I=Modified Independent, S=Supervision/Setup, SBA=Standby Assistance, CGA=Contact Guard Assistance, Min=Minimal Assistance, Mod=Moderate Assistance, Max=Maximal Assistance, Total=Total Assistance, NT=Not Tested    ACTIVITIES OF DAILY LIVING: I Mod I S SBA CGA Min Mod Max Total NT Comments   BASIC ADLs:              Upper Body Bathing [] [] [] [] [] [] [] [] [] []    Lower Body Bathing [] [] [] [] [] [] [] [] [] []    Toileting [] [] [] [] [] [x] [] [] [] []    Upper Body Dressing [] [] [x] [] [] [] [] [] [] []    Lower Body Dressing [] [] [] [] [] [x] [] [] [] []    Feeding [] [] [x] [] [] [] [] [] [] []    Grooming [] [] [] [] [x] [] [] [] [] []    Personal Device Care [] [] [] [] [] [] [] [] [] []    Functional Mobility [] [] [] [] [] [x] [] [] [] [] rw   I=Independent, Mod I=Modified Independent, S=Supervision/Setup, SBA=Standby Assistance, CGA=Contact Guard Assistance, Min=Minimal Assistance, Mod=Moderate Assistance, Max=Maximal Assistance, Total=Total Assistance, NT=Not Tested    PLAN:     FREQUENCY/DURATION   OT Plan of Care: 1 time/week, 2 times/week for duration of hospital stay or until stated goals are met, 
until stated goals are met, whichever comes first.    THERAPY PROGNOSIS: Good    PROBLEM LIST:   (Skilled intervention is medically necessary to address:)  Decreased ADL/Functional Activities, Decreased Activity Tolerance, Decreased AROM/PROM, Decreased Gait Ability, Decreased Strength, Decreased Transfer Abilities, and Increased Pain   INTERVENTIONS PLANNED:   (Benefits and precautions of physical therapy have been discussed with the patient.)  Therapeutic Activity, Therapeutic Exercise/HEP, Gait Training, Modalities, and Education       TREATMENT:   EVALUATION: LOW COMPLEXITY: (Untimed Charge)  The initial evaluation charge encompasses clinical chart review, objective assessment, interpretation of assessment, and skilled monitoring of the patient's response to treatment in order to develop a plan of care.     TREATMENT:   Therapeutic Activity (10 Minutes): Therapeutic activity included Transfer Training, Ambulation on level ground, Sitting balance , and Standing balance to improve functional Activity tolerance, Balance, Coordination, Mobility, and Strength.    TREATMENT GRID:  THERAPEUTIC  EXERCISES: DATE:   DATE:   DATE:      AM PM AM PM AM PM    [] [] [] [] [] []   Ankle Pumps         Quad Sets         Gluteal Sets         Hip Abd/ADduction         Knee Slides         Short Arc Quads         Long Arc Quads                                    B = bilateral; AA = active assistive; A = active; P = passive      EDUCATION:  [] Home Exercises  [x] Fall Precautions  [] Hip Precautions  [] D/C Instruction Review [] Hip Prosthesis Review  [x] Walker Management/Safety  [] Adaptive Equipment as Needed     Interdisciplinary Patient Education   (Reference education tab)    AFTER TREATMENT PRECAUTIONS: Bed/Chair Locked, Call light within reach, Chair, and Needs within reach    INTERDISCIPLINARY COLLABORATION:  RN/ PCT    Compliance with Program/Exercises: Will assess as treatment progresses.    Recommendations/Intent for next 
  Comments   Strength [x] []    Range of Motion [x] []      SENSATION  Sensation [x]       COGNITION/  PERCEPTION: Intact Impaired (Comments):   Orientation [x]     Vision [x]     Hearing [x]     Cognition  [x]       MOBILITY: I Mod I S SBA CGA Min Mod Max Total  NT x2 Comments:   Bed Mobility    Rolling [] [] [] [] [] [] [] [] [] [x] []    Supine to Sit [] [] [] [] [] [] [] [] [] [x] []    Scooting [] [] [] [] [] [] [] [] [] [x] []    Sit to Supine [] [] [] [] [] [] [] [] [] [x] []    Transfers    Sit to Stand [] [] [] [x] [] [] [] [] [] [] [] close   Bed to Chair [] [] [] [x] [] [] [] [] [] [] []    Stand to Sit [] [] [] [x] [] [] [] [] [] [] []    Stand Pivot [] [] [] [] [] [] [] [] [] [] []    Toilet [] [] [] [] [] [] [] [] [] [] []     [] [] [] [] [] [] [] [] [] [] []    I=Independent, Mod I=Modified Independent, S=Supervision, SBA=Standby Assistance, CGA=Contact Guard Assistance,   Min=Minimal Assistance, Mod=Moderate Assistance, Max=Maximal Assistance, Total=Total Assistance, NT=Not Tested    BALANCE: Good Fair+ Fair Fair- Poor NT Comments   Sitting Static [x] [] [] [] [] []    Sitting Dynamic [] [x] [] [] [] []              Standing Static [] [] [x] [] [] []    Standing Dynamic [] [] [] [x] [] []      PLAN:   FREQUENCY AND DURATION: BID for duration of hospital stay or until stated goals are met, whichever comes first.    THERAPY PROGNOSIS: Good    PROBLEM LIST:   (Skilled intervention is medically necessary to address:)  Decreased ADL/Functional Activities, Decreased Activity Tolerance, Decreased AROM/PROM, Decreased Gait Ability, Decreased Strength, Decreased Transfer Abilities, and Increased Pain   INTERVENTIONS PLANNED:   (Benefits and precautions of physical therapy have been discussed with the patient.)  Therapeutic Activity, Therapeutic Exercise/HEP, Gait Training, Modalities, and Education       TREATMENT:   EVALUATION: LOW COMPLEXITY: (Untimed Charge)  The initial evaluation charge encompasses 
within reach, and RN notified    INTERDISCIPLINARY COLLABORATION:  RN/ PCT, PT/ PTA, and OT/ STRONG    Interdisciplinary Patient Education  (Reference education tab)    [x] Safe And Effective Hygiene  [x] Fall Precautions  [x] Hip Precautions  [x] D/C Instruction Review [x] Self Care Training and Home Safety  [x] Walker Management/Safety  [x] Adaptive Equipment as Needed  [x] Therapeutic Resting Position of Joint     TOTAL TREATMENT DURATION AND TIME:  Time in 820  Time out 835  Minutes 15  Time In: 0850  Time Out: 0935  Minutes: 45     Sara Alatorre, OT

## 2024-04-11 NOTE — CARE COORDINATION
PT and OT reported pt doing much better today. Patient and staff now feel she can care for self at home.  Rehab plans cancelled and pt decided to go home. Lotus with [facility] notified of dc.  OT, and aide added to HHPT orders. Pt asked that HH call her aide/neighbor to schedule all appts as phone calls upset her.  Aide is April Johnson  [phone].  Pts sister picked her up for ride home.  Pt found to have only a rollator walker so a fixed wheeled walker was arranged through [facility] as pt did not have a preference towards provider. Walker delivered prior to d/c.  Pt agreed with plans.       04/09/24 1434   Service Assessment   Patient Orientation Alert and Oriented   Cognition Alert   History Provided By Patient   Primary Caregiver Self   Services At/After Discharge   Transition of Care Consult (CM Consult) Home Health   Internal Home Health Yes   Services At/After Discharge Home Health;PT   Mode of Transport at Discharge Self   Confirm Follow Up Transport Self   Condition of Participation: Discharge Planning   The Plan for Transition of Care is related to the following treatment goals: improve mobility   The Patient and/or Patient Representative was provided with a Choice of Provider? Patient   The Patient and/Or Patient Representative agree with the Discharge Plan? Yes   Freedom of Choice list was provided with basic dialogue that supports the patient's individualized plan of care/goals, treatment preferences, and shares the quality data associated with the providers?  Yes

## 2024-04-11 NOTE — FLOWSHEET NOTE
04/11/24 1427   AVS Reviewed   AVS & discharge instructions reviewed with patient and/or representative? Yes   Reviewed instructions with Patient   Level of Understanding Questions answered;Teach back completed;Verbalized understanding

## 2024-04-11 NOTE — DISCHARGE SUMMARY
tablet  Commonly known as: Aspirin 81  Take 1 tablet by mouth in the morning and at bedtime     celecoxib 200 MG capsule  Commonly known as: CeleBREX  Take 1 capsule by mouth 2 times daily     HYDROmorphone 2 MG tablet  Commonly known as: Dilaudid  Take 1 tablet by mouth every 4 hours as needed for Pain for up to 5 days. Max Daily Amount: 12 mg     methocarbamol 750 MG tablet  Commonly known as: Robaxin-750  Take 1 tablet by mouth 3 times daily as needed (muscle spasms; cramps)     promethazine 12.5 MG tablet  Commonly known as: PHENERGAN  Take 1 tablet by mouth 4 times daily as needed for Nausea            CONTINUE taking these medications      acetaminophen 500 MG tablet  Commonly known as: TYLENOL     albuterol sulfate  (90 Base) MCG/ACT inhaler  Commonly known as: PROVENTIL;VENTOLIN;PROAIR     albuterol-ipratropium  MCG/ACT Aers inhaler  Commonly known as: COMBIVENT RESPIMAT     clonazePAM 1 MG disintegrating tablet  Commonly known as: KLONOPIN     DULoxetine 30 MG extended release capsule  Commonly known as: CYMBALTA     levothyroxine 125 MCG tablet  Commonly known as: SYNTHROID     naloxone 4 MG/0.1ML Liqd nasal spray     ondansetron 4 MG tablet  Commonly known as: ZOFRAN  Take 1 tablet by mouth 3 times daily as needed for Nausea or Vomiting     pregabalin 200 MG capsule  Commonly known as: LYRICA     tiZANidine 4 MG tablet  Commonly known as: ZANAFLEX     vitamin D 25 MCG (1000 UT) Tabs tablet  Commonly known as: CHOLECALCIFEROL            STOP taking these medications      desvenlafaxine succinate 100 MG Tb24 extended release tablet  Commonly known as: PRISTIQ     hydrOXYzine HCl 25 MG tablet  Commonly known as: ATARAX     meloxicam 15 MG tablet  Commonly known as: MOBIC     nicotine 14 MG/24HR  Commonly known as: NICODERM CQ     nicotine polacrilex 4 MG lozenge  Commonly known as: COMMIT     oxyCODONE 5 MG immediate release tablet  Commonly known as: Roxicodone               Where to Get Your

## 2024-04-12 ENCOUNTER — HOME CARE VISIT (OUTPATIENT)
Dept: SCHEDULING | Facility: HOME HEALTH | Age: 64
End: 2024-04-12

## 2024-04-12 ENCOUNTER — TELEPHONE (OUTPATIENT)
Dept: ORTHOPEDICS UNIT | Age: 64
End: 2024-04-12

## 2024-04-12 LAB
BACTERIA SPEC CULT: NORMAL
SERVICE CMNT-IMP: NORMAL

## 2024-04-12 PROCEDURE — 0221000100 HH NO PAY CLAIM PROCEDURE

## 2024-04-12 PROCEDURE — G0151 HHCP-SERV OF PT,EA 15 MIN: HCPCS

## 2024-04-12 ASSESSMENT — ENCOUNTER SYMPTOMS
COUGH CHARACTERISTICS: NON-PRODUCTIVE
COUGH: 1
PAIN LOCATION - PAIN QUALITY: STABBING
DYSPNEA ACTIVITY LEVEL: AFTER AMBULATING MORE THAN 20 FT

## 2024-04-12 NOTE — TELEPHONE ENCOUNTER
Patient called to ask if she can wear a nicoderm patch post op.  Is 3 days s/p MICHAEL.  Per Dr. Hu's office patient may wear nicoderm patch.  Patient was updated with this information.

## 2024-04-15 ENCOUNTER — HOME CARE VISIT (OUTPATIENT)
Dept: SCHEDULING | Facility: HOME HEALTH | Age: 64
End: 2024-04-15

## 2024-04-15 VITALS
TEMPERATURE: 97.9 F | OXYGEN SATURATION: 96 % | DIASTOLIC BLOOD PRESSURE: 68 MMHG | HEART RATE: 63 BPM | SYSTOLIC BLOOD PRESSURE: 106 MMHG | RESPIRATION RATE: 16 BRPM

## 2024-04-15 LAB
BACTERIA SPEC CULT: NORMAL
SERVICE CMNT-IMP: NORMAL

## 2024-04-15 PROCEDURE — G0151 HHCP-SERV OF PT,EA 15 MIN: HCPCS

## 2024-04-17 ENCOUNTER — HOME CARE VISIT (OUTPATIENT)
Dept: SCHEDULING | Facility: HOME HEALTH | Age: 64
End: 2024-04-17

## 2024-04-17 VITALS
DIASTOLIC BLOOD PRESSURE: 60 MMHG | SYSTOLIC BLOOD PRESSURE: 120 MMHG | HEART RATE: 65 BPM | RESPIRATION RATE: 18 BRPM | OXYGEN SATURATION: 97 % | TEMPERATURE: 98.5 F

## 2024-04-17 VITALS
TEMPERATURE: 98.4 F | OXYGEN SATURATION: 98 % | DIASTOLIC BLOOD PRESSURE: 68 MMHG | RESPIRATION RATE: 18 BRPM | SYSTOLIC BLOOD PRESSURE: 128 MMHG | HEART RATE: 72 BPM

## 2024-04-17 DIAGNOSIS — Z96.641 STATUS POST RIGHT HIP REPLACEMENT: Primary | ICD-10-CM

## 2024-04-17 PROCEDURE — G0152 HHCP-SERV OF OT,EA 15 MIN: HCPCS

## 2024-04-17 PROCEDURE — G0151 HHCP-SERV OF PT,EA 15 MIN: HCPCS

## 2024-04-17 RX ORDER — HYDROMORPHONE HYDROCHLORIDE 2 MG/1
2 TABLET ORAL EVERY 4 HOURS PRN
Qty: 30 TABLET | Refills: 0 | Status: SHIPPED | OUTPATIENT
Start: 2024-04-17 | End: 2024-04-22

## 2024-04-17 ASSESSMENT — ENCOUNTER SYMPTOMS
DYSPNEA ACTIVITY LEVEL: AFTER AMBULATING 10 - 20 FT
TROUBLE SWALLOWING: 1

## 2024-04-17 NOTE — TELEPHONE ENCOUNTER
She needs to speak to someone regarding her medication. Please call. She spilled her medication down the drain in the middle of the night and doesn't know what to do. I told her with it being a week and a day out from surgery that she is due for a refill anyway. Please send to the [facility] on file.

## 2024-04-17 NOTE — TELEPHONE ENCOUNTER
The pharmacy is telling her that we have to do a prior auth for the Dilaudid for her [employer] to pay for it. Please let her know what to do. The number is [phone]

## 2024-04-18 ENCOUNTER — TELEPHONE (OUTPATIENT)
Dept: ORTHOPEDIC SURGERY | Age: 64
End: 2024-04-18

## 2024-04-18 ENCOUNTER — HOME CARE VISIT (OUTPATIENT)
Dept: SCHEDULING | Facility: HOME HEALTH | Age: 64
End: 2024-04-18

## 2024-04-18 VITALS
RESPIRATION RATE: 16 BRPM | HEART RATE: 64 BPM | OXYGEN SATURATION: 92 % | SYSTOLIC BLOOD PRESSURE: 112 MMHG | DIASTOLIC BLOOD PRESSURE: 68 MMHG | TEMPERATURE: 97.8 F

## 2024-04-18 PROCEDURE — G0157 HHC PT ASSISTANT EA 15: HCPCS

## 2024-04-23 ENCOUNTER — HOME CARE VISIT (OUTPATIENT)
Dept: HOME HEALTH SERVICES | Facility: HOME HEALTH | Age: 64
End: 2024-04-23
Payer: MEDICARE

## 2024-04-23 ENCOUNTER — HOME CARE VISIT (OUTPATIENT)
Dept: SCHEDULING | Facility: HOME HEALTH | Age: 64
End: 2024-04-23
Payer: MEDICARE

## 2024-04-23 VITALS
HEART RATE: 75 BPM | OXYGEN SATURATION: 98 % | TEMPERATURE: 98 F | DIASTOLIC BLOOD PRESSURE: 76 MMHG | RESPIRATION RATE: 16 BRPM | SYSTOLIC BLOOD PRESSURE: 127 MMHG

## 2024-04-23 DIAGNOSIS — Z96.641 STATUS POST RIGHT HIP REPLACEMENT: Primary | ICD-10-CM

## 2024-04-23 PROCEDURE — G0158 HHC OT ASSISTANT EA 15: HCPCS

## 2024-04-23 PROCEDURE — G0157 HHC PT ASSISTANT EA 15: HCPCS

## 2024-04-23 RX ORDER — HYDROMORPHONE HYDROCHLORIDE 2 MG/1
2 TABLET ORAL EVERY 4 HOURS PRN
Qty: 30 TABLET | Refills: 0 | Status: SHIPPED | OUTPATIENT
Start: 2024-04-23 | End: 2024-04-28

## 2024-04-23 ASSESSMENT — ENCOUNTER SYMPTOMS: PAIN LOCATION - PAIN QUALITY: ACHES

## 2024-04-24 VITALS
TEMPERATURE: 98 F | HEART RATE: 70 BPM | SYSTOLIC BLOOD PRESSURE: 120 MMHG | DIASTOLIC BLOOD PRESSURE: 70 MMHG | OXYGEN SATURATION: 95 % | RESPIRATION RATE: 18 BRPM

## 2024-04-24 NOTE — CASE COMMUNICATION
Saw patient on afternoon of  for staple removal and observed the followin.  Patient had taken off her Aquacel on Saturday, stated it was \"bothering\" her, incision was clean, dry and intact, no signs of infection  2.  Patient had severe mood swings, would be laughing one minute and crying the next.  3.  Patient has not been using ice, and has a huge pocket of edema around incision.  4.  Patient was not using her RW as directed a nd was \"hobbling\" around her home, urged patient to use her RW at ALL times.  5.  Patient stated her pain was a constant 7/10, she is out of pain meds, but does not want to call for more, does not want Dr. Bhatti to think she is a \"pill popper\".  Call me if you have any questions.    Vida Garcia PTA  [facility], [place]  [phone]

## 2024-04-26 ENCOUNTER — HOME CARE VISIT (OUTPATIENT)
Dept: SCHEDULING | Facility: HOME HEALTH | Age: 64
End: 2024-04-26
Payer: MEDICARE

## 2024-04-26 ENCOUNTER — HOME CARE VISIT (OUTPATIENT)
Dept: HOME HEALTH SERVICES | Facility: HOME HEALTH | Age: 64
End: 2024-04-26
Payer: MEDICARE

## 2024-04-26 VITALS
RESPIRATION RATE: 16 BRPM | OXYGEN SATURATION: 98 % | SYSTOLIC BLOOD PRESSURE: 121 MMHG | HEART RATE: 77 BPM | DIASTOLIC BLOOD PRESSURE: 79 MMHG | TEMPERATURE: 98 F

## 2024-04-26 PROCEDURE — G0151 HHCP-SERV OF PT,EA 15 MIN: HCPCS

## 2024-04-26 PROCEDURE — G0158 HHC OT ASSISTANT EA 15: HCPCS

## 2024-04-26 ASSESSMENT — ENCOUNTER SYMPTOMS: PAIN LOCATION - PAIN QUALITY: ACHE

## 2024-04-28 VITALS
TEMPERATURE: 98 F | OXYGEN SATURATION: 97 % | RESPIRATION RATE: 18 BRPM | SYSTOLIC BLOOD PRESSURE: 122 MMHG | HEART RATE: 83 BPM | DIASTOLIC BLOOD PRESSURE: 70 MMHG

## 2024-04-29 ENCOUNTER — HOME CARE VISIT (OUTPATIENT)
Dept: SCHEDULING | Facility: HOME HEALTH | Age: 64
End: 2024-04-29
Payer: MEDICARE

## 2024-04-29 VITALS
TEMPERATURE: 96.9 F | HEART RATE: 91 BPM | SYSTOLIC BLOOD PRESSURE: 122 MMHG | DIASTOLIC BLOOD PRESSURE: 72 MMHG | OXYGEN SATURATION: 95 % | RESPIRATION RATE: 18 BRPM

## 2024-04-29 PROCEDURE — G0157 HHC PT ASSISTANT EA 15: HCPCS

## 2024-04-29 ASSESSMENT — ENCOUNTER SYMPTOMS: PAIN LOCATION - PAIN QUALITY: ACHES

## 2024-04-30 ENCOUNTER — HOME CARE VISIT (OUTPATIENT)
Dept: HOME HEALTH SERVICES | Facility: HOME HEALTH | Age: 64
End: 2024-04-30
Payer: MEDICARE

## 2024-04-30 ENCOUNTER — TELEPHONE (OUTPATIENT)
Dept: ORTHOPEDICS UNIT | Age: 64
End: 2024-04-30

## 2024-04-30 NOTE — CASE COMMUNICATION
Saw patient on afternoon of  for physical therapy and observed the followin. Patient continues to have severe mood swings, would be laughing one minute and crying the next.   2. Patient still has not been using ice, and has a huge pocket of edema around incision.   3. Patient is still  not using her RW as directed and was \"hobbling\" around her home, urged patient to use her RW at ALL times.   4. Patient stated her pain was a  constant 7/10, says she is not taking pain meds  5.  She refused to do any standing therex, says it hurts too much  6.  I think she needs more therapy, but she does not.   Call me if you have any questions.   Vida Garcia PTA   [facility]   [phone]

## 2024-04-30 NOTE — TELEPHONE ENCOUNTER
Patient left  to report \" I had a bad fall last night.\"  Navigator returned patient's phone call.  Patient did not answer.   left with contact number for ortho navigator.

## 2024-05-01 ENCOUNTER — HOME CARE VISIT (OUTPATIENT)
Dept: SCHEDULING | Facility: HOME HEALTH | Age: 64
End: 2024-05-01
Payer: MEDICARE

## 2024-05-01 VITALS
OXYGEN SATURATION: 93 % | RESPIRATION RATE: 16 BRPM | DIASTOLIC BLOOD PRESSURE: 60 MMHG | HEART RATE: 56 BPM | TEMPERATURE: 98.3 F | SYSTOLIC BLOOD PRESSURE: 116 MMHG

## 2024-05-01 DIAGNOSIS — M16.11 PRIMARY OSTEOARTHRITIS OF RIGHT HIP: ICD-10-CM

## 2024-05-01 PROCEDURE — G0152 HHCP-SERV OF OT,EA 15 MIN: HCPCS

## 2024-05-01 ASSESSMENT — ENCOUNTER SYMPTOMS: PAIN LOCATION - PAIN QUALITY: ACHE

## 2024-05-02 ENCOUNTER — HOME CARE VISIT (OUTPATIENT)
Dept: SCHEDULING | Facility: HOME HEALTH | Age: 64
End: 2024-05-02
Payer: MEDICARE

## 2024-05-02 VITALS
RESPIRATION RATE: 18 BRPM | DIASTOLIC BLOOD PRESSURE: 68 MMHG | HEART RATE: 78 BPM | OXYGEN SATURATION: 96 % | TEMPERATURE: 98 F | SYSTOLIC BLOOD PRESSURE: 110 MMHG

## 2024-05-02 DIAGNOSIS — Z96.641 STATUS POST RIGHT HIP REPLACEMENT: Primary | ICD-10-CM

## 2024-05-02 PROCEDURE — G0151 HHCP-SERV OF PT,EA 15 MIN: HCPCS

## 2024-05-02 RX ORDER — OXYCODONE HYDROCHLORIDE 5 MG/1
5 TABLET ORAL EVERY 4 HOURS PRN
Qty: 30 TABLET | Refills: 0 | Status: SHIPPED | OUTPATIENT
Start: 2024-05-02 | End: 2024-05-07

## 2024-05-02 NOTE — TELEPHONE ENCOUNTER
She is three weeks post op. She fell two nights ago and says it was a bad fall. She is able to weight bear but it does hurt more. She is really having a problem with her balance. Please give her a call.

## 2024-05-06 ENCOUNTER — TELEPHONE (OUTPATIENT)
Dept: ORTHOPEDIC SURGERY | Age: 64
End: 2024-05-06

## 2024-05-06 DIAGNOSIS — M16.11 PRIMARY OSTEOARTHRITIS OF RIGHT HIP: ICD-10-CM

## 2024-05-06 RX ORDER — ASPIRIN 81 MG/1
TABLET, COATED ORAL
Qty: 70 TABLET | Refills: 0 | OUTPATIENT
Start: 2024-05-06

## 2024-05-06 RX ORDER — CELECOXIB 200 MG/1
200 CAPSULE ORAL 2 TIMES DAILY
Qty: 60 CAPSULE | Refills: 0 | OUTPATIENT
Start: 2024-05-06

## 2024-05-06 NOTE — TELEPHONE ENCOUNTER
She took a bad fall last week and she talked to [facility]. She says she cannot bare weight without severe pain in her right hip. She is asking to speak to someone again.

## 2024-05-07 ENCOUNTER — TELEPHONE (OUTPATIENT)
Dept: ORTHOPEDIC SURGERY | Age: 64
End: 2024-05-07

## 2024-05-14 ENCOUNTER — OFFICE VISIT (OUTPATIENT)
Dept: ORTHOPEDIC SURGERY | Age: 64
End: 2024-05-14

## 2024-05-14 DIAGNOSIS — Z96.641 STATUS POST RIGHT HIP REPLACEMENT: Primary | ICD-10-CM

## 2024-05-14 NOTE — PROGRESS NOTES
Post-op hip visit    05/14/24     Allergies:  Allergies   Allergen Reactions    Acetaminophen Swelling    Penicillins Anaphylaxis, Hives, Rash and Swelling    Tapentadol Anaphylaxis and Other (See Comments)     Other Reaction(s): Other- (not listed) - Allergy      Heart palpitation Sweating Nausea Hypertension    Heart palpitation   Sweating   Nausea   Hypertension    Levofloxacin Angioedema     Other Reaction(s): Lips/Mouth swelling-Allergy         Medications:  Current Outpatient Medications on File Prior to Visit   Medication Sig Dispense Refill    acetaminophen (TYLENOL) 500 MG tablet Take 2 tablets by mouth every 6 hours as needed for Pain (breakthrough pain)      albuterol sulfate HFA (PROVENTIL;VENTOLIN;PROAIR) 108 (90 Base) MCG/ACT inhaler Inhale 1-2 puffs into the lungs every 4 hours as needed      vitamin D (CHOLECALCIFEROL) 25 MCG (1000 UT) TABS tablet Take 1 tablet by mouth daily      clonazePAM (KLONOPIN) 1 MG disintegrating tablet Take 1 tablet by mouth 2 times daily as needed for Anxiety.      DULoxetine (CYMBALTA) 30 MG extended release capsule Take 1 capsule by mouth daily      levothyroxine (SYNTHROID) 125 MCG tablet Take 1 tablet by mouth daily      naloxone 4 MG/0.1ML LIQD nasal spray 1 spray as needed for Opioid Reversal      tiZANidine (ZANAFLEX) 4 MG tablet Take 1 tablet by mouth every 8 hours as needed (muscle spasm)      pregabalin (LYRICA) 200 MG capsule Take 1 capsule by mouth 2 times daily.      albuterol-ipratropium (COMBIVENT RESPIMAT)  MCG/ACT AERS inhaler Inhale 1 puff into the lungs in the morning and at bedtime      aspirin (ASPIRIN 81) 81 MG EC tablet Take 1 tablet by mouth in the morning and at bedtime 70 tablet 0    celecoxib (CELEBREX) 200 MG capsule Take 1 capsule by mouth 2 times daily 60 capsule 0    promethazine (PHENERGAN) 12.5 MG tablet Take 1 tablet by mouth 4 times daily as needed for Nausea 20 tablet 2    ondansetron (ZOFRAN) 4 MG tablet Take 1 tablet by mouth 3

## 2024-05-16 DIAGNOSIS — Z96.641 STATUS POST RIGHT HIP REPLACEMENT: Primary | ICD-10-CM

## 2024-05-16 RX ORDER — OXYCODONE HYDROCHLORIDE 5 MG/1
5 TABLET ORAL EVERY 4 HOURS PRN
Qty: 30 TABLET | Refills: 0 | Status: SHIPPED | OUTPATIENT
Start: 2024-05-16 | End: 2024-05-21

## 2024-05-23 ENCOUNTER — TELEPHONE (OUTPATIENT)
Dept: ORTHOPEDIC SURGERY | Age: 64
End: 2024-05-23

## 2024-06-06 DIAGNOSIS — M16.11 PRIMARY OSTEOARTHRITIS OF RIGHT HIP: ICD-10-CM

## 2024-06-06 DIAGNOSIS — Z96.641 STATUS POST RIGHT HIP REPLACEMENT: Primary | ICD-10-CM

## 2024-06-06 RX ORDER — CELECOXIB 200 MG/1
200 CAPSULE ORAL DAILY
Qty: 30 CAPSULE | Refills: 0 | Status: SHIPPED | OUTPATIENT
Start: 2024-06-06 | End: 2024-07-06

## 2024-06-06 NOTE — TELEPHONE ENCOUNTER
She ran out of Celebrex and was taking it once per day. She needs an anti inf can't take Mobic because of her kidneys. She called her pcp who told her she would need to call us for a refill of it.

## 2024-07-07 DIAGNOSIS — Z96.641 STATUS POST RIGHT HIP REPLACEMENT: ICD-10-CM

## 2024-07-08 RX ORDER — CELECOXIB 200 MG/1
CAPSULE ORAL DAILY
Qty: 30 CAPSULE | Refills: 0 | OUTPATIENT
Start: 2024-07-08

## 2024-07-25 ENCOUNTER — OFFICE VISIT (OUTPATIENT)
Dept: NEUROSURGERY | Age: 64
End: 2024-07-25
Payer: MEDICARE

## 2024-07-25 ENCOUNTER — TELEPHONE (OUTPATIENT)
Dept: ORTHOPEDIC SURGERY | Age: 64
End: 2024-07-25

## 2024-07-25 VITALS
BODY MASS INDEX: 23.07 KG/M2 | DIASTOLIC BLOOD PRESSURE: 75 MMHG | HEIGHT: 67 IN | TEMPERATURE: 96.3 F | SYSTOLIC BLOOD PRESSURE: 109 MMHG | OXYGEN SATURATION: 94 % | WEIGHT: 147 LBS | HEART RATE: 75 BPM

## 2024-07-25 DIAGNOSIS — R20.2 TINGLING OF LEFT UPPER EXTREMITY: ICD-10-CM

## 2024-07-25 DIAGNOSIS — M54.2 NECK PAIN, MUSCULOSKELETAL: Primary | ICD-10-CM

## 2024-07-25 DIAGNOSIS — R20.2 TINGLING OF RIGHT UPPER EXTREMITY: ICD-10-CM

## 2024-07-25 DIAGNOSIS — R26.89 POOR BALANCE: ICD-10-CM

## 2024-07-25 PROCEDURE — 3017F COLORECTAL CA SCREEN DOC REV: CPT | Performed by: NURSE PRACTITIONER

## 2024-07-25 PROCEDURE — 3074F SYST BP LT 130 MM HG: CPT | Performed by: NURSE PRACTITIONER

## 2024-07-25 PROCEDURE — 99203 OFFICE O/P NEW LOW 30 MIN: CPT | Performed by: NURSE PRACTITIONER

## 2024-07-25 PROCEDURE — 4004F PT TOBACCO SCREEN RCVD TLK: CPT | Performed by: NURSE PRACTITIONER

## 2024-07-25 PROCEDURE — G8420 CALC BMI NORM PARAMETERS: HCPCS | Performed by: NURSE PRACTITIONER

## 2024-07-25 PROCEDURE — 3078F DIAST BP <80 MM HG: CPT | Performed by: NURSE PRACTITIONER

## 2024-07-25 PROCEDURE — G8427 DOCREV CUR MEDS BY ELIG CLIN: HCPCS | Performed by: NURSE PRACTITIONER

## 2024-07-25 NOTE — TELEPHONE ENCOUNTER
Called and LVM for pt that I would move her appt up to 8/28 at 1:30pm and to call back if she has any questions or concerns about this date. Also informed pt that Dr. Nichols's team would be in touch about possibly seeing her sooner.

## 2024-07-25 NOTE — PROGRESS NOTES
Extremities: Right Left      Deltoid  5 5    Biceps  5 5    Triceps  5 5      5 5   Hand Intrinsics  5 5  Wrist flexors/extensors  5 5       Sensation intact to light touch and pin-prick   DTR 2+  No clonus or babinski present   No Yancey's sign present bilaterally   Gait unsteady    Assessment & Plan:  Olive Mireles is a 63 y.o. female who presents to be evaluated for her intense neck pain.  I discussed with the patient that given that she has been fused through almost the entire cervical spine there is nothing left surgically that we can offer her.  Patient is concerned because she has had innumerable falls that she might have injured her cervical hardware.  We will order new AP and lateral cervical x-rays to make sure that the patient's hardware is still intact and in place.  Send the patient for 6 weeks of balance physical therapy we will attempt to see if we can get this in the form of home health therapy.  We are also sending a referral to a pain management group in [place] for the patient to restart her pain management treatment.    Total of 33 minutes was spent in chart review, patient consultation, and documentation.  No diagnosis found.    Notes are transcribed with [employer], a medical voice recording dictation service, and may contain minor errors.    Nguyen Herbert NP  [facility]  [facility]

## 2024-07-25 NOTE — TELEPHONE ENCOUNTER
Called and spoke to pt about when she fell and what was going on. Pt stated that she saw Katherine chan about 2 weeks ago and fell the same night as receiving bilateral shoulder injections. Pt states that she is in pain and has a limited ROM. Informed pt that I would call back after consulting with AGP. Pt voiced understanding.

## 2024-07-25 NOTE — TELEPHONE ENCOUNTER
Message sent to [place] regarding getting patient in sooner, she has had shoulder injections this past Thursday and has fallen and injured her right should this weekend. Will wait for further instruction.     Referrals  sent  this  message on 7/15 asking  for  a work in..  Pt  called  today and I scheduled her  for  9/4.  Can she be seen sooner?

## 2024-08-15 ENCOUNTER — TELEPHONE (OUTPATIENT)
Dept: ORTHOPEDIC SURGERY | Age: 64
End: 2024-08-15

## 2024-08-15 NOTE — TELEPHONE ENCOUNTER
Patient called in office wants to talk to Sara santiago/ Dr Long, says she scheduled apt for 8/28 1:30 but can't come in the afternoon, I told her his next avail apt is 10/2 she started crying wanting to talk to Sara

## 2024-08-15 NOTE — TELEPHONE ENCOUNTER
Called and spoke to pt who accidentally wrote 9:15am instead of 1:30pm on her calendar. Advised pt that if she doesn't keep the appt for 8/28, the new appt won't be until 9/11. Pt stated she would ask her transport if they can stay until the afternoon for this appt and will call us back to let us know if we need to reschedule or not.

## 2024-08-28 NOTE — ED PROVIDER NOTES
HPI Comments: Patient states she had a MRI yesterday and was told by her pcp to come to the ED for pain control. She states that she has spinal stenosis and a bulging disk in her lumbar back. She states numbness and pain radiates into her bilateral thighs and down into her feet. Patient was ambulatory to room with a cane. Patient is a 55 y.o. female presenting with back pain. The history is provided by the patient. Back Pain    This is a chronic problem. The current episode started more than 1 week ago. The problem has been gradually worsening. The problem occurs constantly. Patient reports not work related injury. The pain is associated with no known injury. The pain is present in the lumbar spine. The quality of the pain is described as burning. The pain radiates to the left thigh, left knee, left foot, right thigh, right knee and right foot. The pain is at a severity of 8/10. The pain is moderate. The symptoms are aggravated by twisting, certain positions and bending. The pain is the same all the time. Associated symptoms include numbness and tingling. Pertinent negatives include no chest pain, no fever, no weight loss, no headaches, no abdominal pain, no abdominal swelling, no bowel incontinence, no perianal numbness, no bladder incontinence, no dysuria, no pelvic pain, no leg pain, no paresthesias, no paresis and no weakness. Past Medical History:   Diagnosis Date    Anxiety     Asthma     Chronic pain     DDD (degenerative disc disease), lumbosacral     Depression     Grief reaction     Hypertension, essential, benign     Hypothyroidism        Past Surgical History:   Procedure Laterality Date    Hx hysterectomy  2001     Total         History reviewed. No pertinent family history. Social History     Social History    Marital status:      Spouse name: N/A    Number of children: N/A    Years of education: N/A     Occupational History    Not on file.      Social History Main Start with the bleach baths.   Alternate it with epsom baths.  AVOID any soaps to body.  Use lukewarm water or ice cold water to start.  Drink up to 64 oz of water daily. If you are sweating at work then use liquid hydration packets like liquid IV.  Right after the shower you want slather yourself in vaseline OR Cerave cream.         Topics    Smoking status: Former Smoker    Smokeless tobacco: Not on file    Alcohol use Not on file    Drug use: Not on file    Sexual activity: Not on file     Other Topics Concern    Not on file     Social History Narrative         ALLERGIES: Pcn [penicillins]    Review of Systems   Constitutional: Negative for fever and weight loss. Respiratory: Negative for cough and shortness of breath. Cardiovascular: Negative for chest pain and palpitations. Gastrointestinal: Negative for abdominal pain, bowel incontinence, nausea and vomiting. Genitourinary: Negative for bladder incontinence, difficulty urinating, dysuria, pelvic pain, vaginal bleeding and vaginal discharge. Musculoskeletal: Positive for back pain and neck pain (chronic). Skin: Negative for color change. Neurological: Positive for tingling and numbness. Negative for dizziness, weakness, headaches and paresthesias. Visit Vitals    BP (!) 155/98 (BP 1 Location: Right arm, BP Patient Position: At rest)    Pulse (!) 117    Temp 97.4 °F (36.3 °C)    Resp 18    Ht 5' 9\" (1.753 m)    Wt 80.7 kg (178 lb)    SpO2 98%    BMI 26.29 kg/m2              Physical Exam   Constitutional: She is oriented to person, place, and time. She appears well-developed and well-nourished. No distress. HENT:   Head: Normocephalic and atraumatic. Cardiovascular: Regular rhythm, normal heart sounds and normal pulses. Tachycardia present. Pulses:       Dorsalis pedis pulses are 2+ on the right side, and 2+ on the left side. Posterior tibial pulses are 2+ on the right side, and 2+ on the left side. Pulmonary/Chest: Effort normal and breath sounds normal. No respiratory distress. She has no wheezes. Abdominal: Soft. Bowel sounds are normal. She exhibits no distension. There is no tenderness. Musculoskeletal: She exhibits tenderness. She exhibits no edema. Right ankle: She exhibits normal pulse.         Left ankle: She exhibits normal pulse. Cervical back: She exhibits pain. She exhibits no tenderness, no bony tenderness and normal pulse. Thoracic back: She exhibits pain. Lumbar back: She exhibits decreased range of motion and pain. She exhibits no tenderness, no bony tenderness, no swelling and normal pulse. Right upper leg: She exhibits no tenderness. Left upper leg: She exhibits no tenderness. Right lower leg: She exhibits no tenderness. Left lower leg: She exhibits no tenderness. Right foot: There is no tenderness, no swelling and normal capillary refill. Left foot: There is no tenderness, no swelling and normal capillary refill. Neurological: She is alert and oriented to person, place, and time. No cranial nerve deficit. Coordination normal.   Skin: Skin is warm and dry. No rash noted. She is not diaphoretic. No erythema. Psychiatric: She has a normal mood and affect. Her behavior is normal.   Nursing note and vitals reviewed. MDM  Number of Diagnoses or Management Options  Chronic bilateral low back pain with bilateral sciatica: established and worsening  Diagnosis management comments: Patient with chronic back pain. She states she has been falling frequently due to back pain. She already takes Norco, NSAID and muscle relaxer at home. I encouraged her to stop naproxen while taking Medrol dose pack prescribed today. I encouraged her to take Tylenol. Oral decadron given while in ED. She requested Norco but due to her recent falls I did not give prescription for Norco. Back exercises given. I encouraged her to follow up with her pcp for continued pain.        Patient Progress  Patient progress: stable    ED Course       Procedures

## (undated) DEVICE — GLOVE SURG SZ 8 L12IN FNGR THK79MIL GRN LTX FREE

## (undated) DEVICE — SUTURE ABS MF 2-0 CT1 27IN STRATAFIX PDS+ SXPP1B412

## (undated) DEVICE — DUAL CUT SAGITTAL BLADE

## (undated) DEVICE — STAPLER SKIN SQ 30 ABSRB STPL DISP INSORB ORDER VIA PHONE OR EMAIL

## (undated) DEVICE — SOLUTION IRRIG 3000ML 0.9% SOD CHL USP UROMATIC PLAS CONT

## (undated) DEVICE — YANKAUER,FLEXIBLE HANDLE,REGLR CAPACITY: Brand: MEDLINE INDUSTRIES, INC.

## (undated) DEVICE — STERILE PVP: Brand: MEDLINE INDUSTRIES, INC.

## (undated) DEVICE — SUTURE VICRYL SZ 1 L27IN ABSRB UD CT-1 L36MM 1/2 CIR J261H

## (undated) DEVICE — BIPOLAR SEALER 23-112-1 AQM 6.0: Brand: AQUAMANTYS ®

## (undated) DEVICE — DRESSING HYDROFIBER AQUACEL AG ADVANTAGE 3.5X10 IN

## (undated) DEVICE — HOOD: Brand: T7PLUS

## (undated) DEVICE — COVER,MAYO STAND,XL,STERILE: Brand: MEDLINE

## (undated) DEVICE — SUTURE ETHIBOND EXCEL SZ 5 L30IN NONABSORBABLE GRN L40MM V-37 MB66G

## (undated) DEVICE — SUTURE PDS II SZ 1 L36IN ABSRB VLT L48MM CTX 1/2 CIR Z371T

## (undated) DEVICE — DRAPE,TOP,102X53,STERILE: Brand: MEDLINE

## (undated) DEVICE — SUTURE ABSRB X-1 REV CUT 1/2 CIR 22MM UD BRAID 27IN SZ 3-0 J458H

## (undated) DEVICE — SOLUTION IV 250ML 0.9% SOD CHL PH 5 INJ USP VIAFLX PLAS

## (undated) DEVICE — PIN FIX TROCAR PT 1 END 7/64X9 IN 1 PT STYL SMOOTH PLN STRL
Type: IMPLANTABLE DEVICE | Site: HIP | Status: NON-FUNCTIONAL
Removed: 2024-04-09

## (undated) DEVICE — TOTAL HIP DR JENNINGS: Brand: MEDLINE INDUSTRIES, INC.

## (undated) DEVICE — GLOVE ORANGE PI 8   MSG9080

## (undated) DEVICE — SOLUTION IRRIG 1000ML STRL H2O USP PLAS POUR BTL

## (undated) DEVICE — SUTURE ABS ANTIBACT 1-0 CTX 24IN STRATAFIX PDS+ SXPP1A445